# Patient Record
Sex: FEMALE | Race: WHITE | Employment: OTHER | URBAN - METROPOLITAN AREA
[De-identification: names, ages, dates, MRNs, and addresses within clinical notes are randomized per-mention and may not be internally consistent; named-entity substitution may affect disease eponyms.]

---

## 2018-02-03 ENCOUNTER — OFFICE VISIT (OUTPATIENT)
Dept: FAMILY MEDICINE CLINIC | Facility: CLINIC | Age: 78
End: 2018-02-03

## 2018-02-03 VITALS
HEART RATE: 70 BPM | HEIGHT: 60 IN | DIASTOLIC BLOOD PRESSURE: 70 MMHG | RESPIRATION RATE: 16 BRPM | TEMPERATURE: 98 F | SYSTOLIC BLOOD PRESSURE: 132 MMHG | BODY MASS INDEX: 43.78 KG/M2 | WEIGHT: 223 LBS | OXYGEN SATURATION: 94 %

## 2018-02-03 DIAGNOSIS — J06.9 VIRAL URI WITH COUGH: Primary | ICD-10-CM

## 2018-02-03 PROCEDURE — 99203 OFFICE O/P NEW LOW 30 MIN: CPT | Performed by: NURSE PRACTITIONER

## 2018-02-03 RX ORDER — OMEPRAZOLE 20 MG/1
CAPSULE, DELAYED RELEASE ORAL
Status: ON HOLD | COMMUNITY
Start: 2017-11-13 | End: 2021-10-07

## 2018-02-03 RX ORDER — ATENOLOL 50 MG/1
50 TABLET ORAL DAILY
COMMUNITY
Start: 2017-11-13 | End: 2022-01-28

## 2018-02-03 NOTE — PROGRESS NOTES
CHIEF COMPLAINT:   Patient presents with:  Cough: 1 day  Chest Congestion: 1 day      HPI:   Jolynn Simpson is a 68year old female who presents for upper respiratory symptoms for  1 days.  Patient reports dry cough, + influenza vaccine this season, EXTREMITIES: no cyanosis, clubbing or edema  LYMPH:  no lymphadenopathy.         ASSESSMENT AND PLAN:   Sofi Valladares is a 68year old female who presents with upper respiratory symptoms that are consistent with    ASSESSMENT:   Viral uri with coug · Your appetite may be poor, so a light diet is fine. Avoid dehydration by drinking 6 to 8 glasses of fluids per day (water, soft drinks, juices, tea, or soup). Extra fluids will help loosen secretions in the nose and lungs.   · Over-the-counter cold medici You have a viral upper respiratory illness (URI), which is another term for the common cold. This illness is contagious during the first few days. It is spread through the air by coughing and sneezing.  It may also be spread by direct contact (touching the · Cough with lots of colored sputum (mucus)  · Severe headache; face, neck, or ear pain  · Difficulty swallowing due to throat pain  · Fever of 100.4°F (38°C) or higher, or as directed by your healthcare provider  Call 911  Call 911 if any of these occur:

## 2018-02-03 NOTE — PATIENT INSTRUCTIONS
Viral Upper Respiratory Illness (Adult)  You have a viral upper respiratory illness (URI), which is another term for the common cold. This illness is contagious during the first few days. It is spread through the air by coughing and sneezing.  It may also Call your healthcare provider right away if any of these occur:  · Cough with lots of colored sputum (mucus)  · Severe headache; face, neck, or ear pain  · Difficulty swallowing due to throat pain  · Fever of 100.4°F (38°C) or higher, or as directed by you · You may use acetaminophen or ibuprofen to control pain and fever, unless another medicine was prescribed. If you have chronic liver or kidney disease, have ever had a stomach ulcer or gastrointestinal bleeding, or are taking blood-thinning medicines, pilo

## 2021-10-06 ENCOUNTER — APPOINTMENT (OUTPATIENT)
Dept: GENERAL RADIOLOGY | Age: 81
DRG: 605 | End: 2021-10-06
Attending: PHYSICIAN ASSISTANT
Payer: MEDICARE

## 2021-10-06 ENCOUNTER — HOSPITAL ENCOUNTER (INPATIENT)
Facility: HOSPITAL | Age: 81
LOS: 2 days | Discharge: HOME HEALTH | DRG: 605 | End: 2021-10-08
Attending: EMERGENCY MEDICINE | Admitting: HOSPITALIST
Payer: MEDICARE

## 2021-10-06 ENCOUNTER — OFFICE VISIT (OUTPATIENT)
Dept: FAMILY MEDICINE CLINIC | Facility: CLINIC | Age: 81
End: 2021-10-06
Payer: MEDICARE

## 2021-10-06 VITALS
TEMPERATURE: 98 F | OXYGEN SATURATION: 94 % | BODY MASS INDEX: 35.93 KG/M2 | HEART RATE: 66 BPM | HEIGHT: 60 IN | DIASTOLIC BLOOD PRESSURE: 39 MMHG | RESPIRATION RATE: 18 BRPM | WEIGHT: 183 LBS | SYSTOLIC BLOOD PRESSURE: 129 MMHG
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
## 2021-10-06 DIAGNOSIS — L03.114 CELLULITIS OF LEFT UPPER EXTREMITY: ICD-10-CM
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
## 2021-10-06 DIAGNOSIS — D72.829 LEUKOCYTOSIS, UNSPECIFIED TYPE: ICD-10-CM

## 2021-10-06 DIAGNOSIS — L03.119 CELLULITIS OF HAND: Primary | ICD-10-CM

## 2021-10-06 PROCEDURE — 99223 1ST HOSP IP/OBS HIGH 75: CPT | Performed by: HOSPITALIST

## 2021-10-06 PROCEDURE — 73130 X-RAY EXAM OF HAND: CPT | Performed by: PHYSICIAN ASSISTANT

## 2021-10-06 PROCEDURE — 3E0234Z INTRODUCTION OF SERUM, TOXOID AND VACCINE INTO MUSCLE, PERCUTANEOUS APPROACH: ICD-10-PCS | Performed by: HOSPITALIST

## 2021-10-06 RX ORDER — ACETAMINOPHEN 325 MG/1
650 TABLET ORAL EVERY 6 HOURS PRN
Status: DISCONTINUED | OUTPATIENT
Start: 2021-10-06 | End: 2021-10-08

## 2021-10-06 RX ORDER — ACETAMINOPHEN 325 MG/1
650 TABLET ORAL EVERY 4 HOURS PRN
Status: DISCONTINUED | OUTPATIENT
Start: 2021-10-06 | End: 2021-10-08

## 2021-10-06 RX ORDER — SODIUM CHLORIDE 9 MG/ML
INJECTION, SOLUTION INTRAVENOUS CONTINUOUS
Status: ACTIVE | OUTPATIENT
Start: 2021-10-06 | End: 2021-10-06

## 2021-10-06 RX ORDER — POLYETHYLENE GLYCOL 3350 17 G/17G
17 POWDER, FOR SOLUTION ORAL DAILY PRN
Status: DISCONTINUED | OUTPATIENT
Start: 2021-10-06 | End: 2021-10-08

## 2021-10-06 RX ORDER — DOCUSATE SODIUM 100 MG/1
100 CAPSULE, LIQUID FILLED ORAL 2 TIMES DAILY
Status: DISCONTINUED | OUTPATIENT
Start: 2021-10-06 | End: 2021-10-08

## 2021-10-06 RX ORDER — HYDROCODONE BITARTRATE AND ACETAMINOPHEN 5; 325 MG/1; MG/1
2 TABLET ORAL EVERY 4 HOURS PRN
Status: DISCONTINUED | OUTPATIENT
Start: 2021-10-06 | End: 2021-10-08

## 2021-10-06 RX ORDER — MORPHINE SULFATE 2 MG/ML
2 INJECTION, SOLUTION INTRAMUSCULAR; INTRAVENOUS EVERY 2 HOUR PRN
Status: DISCONTINUED | OUTPATIENT
Start: 2021-10-06 | End: 2021-10-08

## 2021-10-06 RX ORDER — MORPHINE SULFATE 4 MG/ML
4 INJECTION, SOLUTION INTRAMUSCULAR; INTRAVENOUS EVERY 2 HOUR PRN
Status: DISCONTINUED | OUTPATIENT
Start: 2021-10-06 | End: 2021-10-08

## 2021-10-06 RX ORDER — MORPHINE SULFATE 2 MG/ML
1 INJECTION, SOLUTION INTRAMUSCULAR; INTRAVENOUS EVERY 2 HOUR PRN
Status: DISCONTINUED | OUTPATIENT
Start: 2021-10-06 | End: 2021-10-08

## 2021-10-06 RX ORDER — METRONIDAZOLE 500 MG/100ML
500 INJECTION, SOLUTION INTRAVENOUS ONCE
Status: COMPLETED | OUTPATIENT
Start: 2021-10-06 | End: 2021-10-06

## 2021-10-06 RX ORDER — TETANUS AND DIPHTHERIA TOXOIDS ADSORBED 2; 2 [LF]/.5ML; [LF]/.5ML
0.5 INJECTION INTRAMUSCULAR ONCE
Status: COMPLETED | OUTPATIENT
Start: 2021-10-06 | End: 2021-10-06

## 2021-10-06 RX ORDER — METOCLOPRAMIDE HYDROCHLORIDE 5 MG/ML
5 INJECTION INTRAMUSCULAR; INTRAVENOUS EVERY 8 HOURS PRN
Status: DISCONTINUED | OUTPATIENT
Start: 2021-10-06 | End: 2021-10-08

## 2021-10-06 RX ORDER — LEVOFLOXACIN 500 MG/1
500 TABLET, FILM COATED ORAL ONCE
Status: COMPLETED | OUTPATIENT
Start: 2021-10-06 | End: 2021-10-06

## 2021-10-06 RX ORDER — HYDROCODONE BITARTRATE AND ACETAMINOPHEN 5; 325 MG/1; MG/1
1 TABLET ORAL EVERY 4 HOURS PRN
Status: DISCONTINUED | OUTPATIENT
Start: 2021-10-06 | End: 2021-10-08

## 2021-10-06 RX ORDER — ONDANSETRON 2 MG/ML
4 INJECTION INTRAMUSCULAR; INTRAVENOUS EVERY 6 HOURS PRN
Status: DISCONTINUED | OUTPATIENT
Start: 2021-10-06 | End: 2021-10-08

## 2021-10-06 RX ORDER — BISACODYL 10 MG
10 SUPPOSITORY, RECTAL RECTAL
Status: DISCONTINUED | OUTPATIENT
Start: 2021-10-06 | End: 2021-10-08

## 2021-10-06 NOTE — PLAN OF CARE
Patient in room, up in chair. Family at bedside. Vitals WDL, on RA, SCD's. Left hand has scratches x2. Hand is swollen, red, and warm +2 pitting edema. Patient states that moving the wrist and fingers is painful, 3/10. Denied pain medication at the moment.

## 2021-10-06 NOTE — PROGRESS NOTES
Spoke with Dr Lizzie Christopher regarding new ortho consult. He will see patient early tomorrow morning, do not need to order NPO at this time. Strict elevation of left hand on pillows.

## 2021-10-06 NOTE — ED INITIAL ASSESSMENT (HPI)
Pt was bit by cat on right wrist 4 days ago, c/o redness and swelling. Sent from [facility]. No known fevers.

## 2021-10-06 NOTE — PROGRESS NOTES
[facility] Pharmacy Note:  Renal Adjustment for ampicillin/sulbactam (UNASYN)    Jareth Sarah is a 80year old patient who has been prescribed ampicillin/sulbactam (UNASYN) 3000 mg every 8 hrs.   The estimated creatinine clearance is 26 mL/min (A) (based

## 2021-10-06 NOTE — ED PROVIDER NOTES
Patient Seen in: THE [facility] Emergency Department In [place]      History   Patient presents with:  Bite  Cellulitis    Stated Complaint: Cellulitis    Subjective:   HPI    51-year-old female. Medical history of morbid obesity and hypertension.   Arrives w (14) - Abnormal; Notable for the following components:       Result Value    Glucose 145 (*)     BUN 27 (*)     Creatinine 1.22 (*)     GFR, Non- 42 (*)     GFR, -American 48 (*)     AST 14 (*)     Albumin 3.3 (*)     A/G Ratio 0.9 ( pm    Follow-up:  No follow-up provider specified.         Medications Prescribed:  Current Discharge Medication List                          Hospital Problems             Present on Admission  Date Reviewed: 10/6/2021          ICD-10-CM Noted POA    * (Pr

## 2021-10-06 NOTE — H&P
[facility] HOSPITAL[facility]  History and Physical     Pierce Rucks Patient Status:  Inpatient    1940 MRN [id number]   [facility] 3SW-A Attending Arash Heaton [address] Day # 0 PCP No primary care provider on file.      Anju Altman No murmurs, rubs or gallops. Equal pulses. Chest and Back: No tenderness or deformity. Abdomen: Soft, nontender, nondistended. Positive bowel sounds. No rebound, guarding or organomegaly. Neurologic: No focal neurological deficits.  CNII-XII grossly in DO  97/2/8624          **Certification      PHYSICIAN Certification of Need for Inpatient Hospitalization - Initial Certification    Patient will require inpatient services that will reasonably be expected to span two midnight's based on the clinical docum

## 2021-10-06 NOTE — PROGRESS NOTES
CHIEF COMPLAINT:     Patient presents with:  Laceration/Abrasion      HPI:   Irma Maguire is a 80year old female who presents with complaints of cat bite. The patient reports her cat bit her on the left hand 4 days ago.   She now has pain, swell normal, no bulging, no retraction, no fluid, bony landmarks normal.  Left TM normal, no bulging, no retraction, no fluid, bony landmarks normal.    NOSE: nostrils patent, no discharge, nasal mucosa pink and not inflamed. No sinus tenderness.   THROAT: oral

## 2021-10-07 PROCEDURE — 99232 SBSQ HOSP IP/OBS MODERATE 35: CPT | Performed by: HOSPITALIST

## 2021-10-07 RX ORDER — ATENOLOL 50 MG/1
50 TABLET ORAL
Status: DISCONTINUED | OUTPATIENT
Start: 2021-10-07 | End: 2021-10-08

## 2021-10-07 RX ORDER — KETOROLAC TROMETHAMINE 15 MG/ML
15 INJECTION, SOLUTION INTRAMUSCULAR; INTRAVENOUS EVERY 6 HOURS PRN
Status: DISCONTINUED | OUTPATIENT
Start: 2021-10-07 | End: 2021-10-08

## 2021-10-07 RX ORDER — KETOROLAC TROMETHAMINE 30 MG/ML
30 INJECTION, SOLUTION INTRAMUSCULAR; INTRAVENOUS EVERY 6 HOURS PRN
Status: DISCONTINUED | OUTPATIENT
Start: 2021-10-07 | End: 2021-10-07 | Stop reason: DRUGHIGH

## 2021-10-07 NOTE — PROGRESS NOTES
[facility] Pharmacy Note:  Age Based Dose Adjustment    Rafiq Arriaga has been prescribed ketorolac (TORADOL) 15-30 mg IV every 6 hours prn. Patient is >71 years old therefore the dose has been adjusted to 15 mg IV every 6 hours prn.       Thank you,  Ra

## 2021-10-07 NOTE — CONSULTS
CHIEF COMPLAINT: Bite and Cellulitis      DIAGNOSIS: Cellulitis of hand  (primary encounter diagnosis)  Cat bite, initial encounter  Leukocytosis, unspecified type    HPI:   Grover Adkins is a 80year old female who presented to the ER for chief c ENDOCRINE: denies excessive thirst or urination; denies unexpected wt gain or wt loss. MUSCULOSKELETAL: As per HPI  ALLERGY/IMM.: denies food or seasonal allergies.      EXAM:   /87 (BP Location: Left arm)   Pulse 60   Temp 97.5 °F (36.4 °C) (Oral

## 2021-10-07 NOTE — PLAN OF CARE
Pt A&Ox4. [place]. On room air. SCDs to BLE. Cat bite to L wrist with redness/edema. Elevated on pillows. Ice packs for pain/swelling. IV unasyn q12. Pt tolerating cardiac diet. Last BM 10/6/21. Miralax given this AM. Pt voiding without difficulty.  Pain contro

## 2021-10-07 NOTE — PROGRESS NOTES
[facility]     Hospitalist Progress Note     Heena Dos Santos Patient Status:  Inpatient    1940 MRN [id number]   [facility] 3SW-A Attending Dannie Thompson MD   Hosp Day # 1 PCP No primary care provider on file.      Chief C CK in the last 168 hours. Inflammatory Markers  No results for input(s): CRP, RAIMUNDO, LDH, DDIMER in the last 168 hours. Imaging: Imaging data reviewed in Epic.     Medications:   • atenolol  50 mg Oral Daily Beta Blocker   • docusate sodium  100 mg Oral

## 2021-10-07 NOTE — PLAN OF CARE
Patient A&O X4 on RA, [place]. VSS, /IS/SCDs. Voiding freely, LBM 10/6. On IV Unasyn Q8h. Redness/warmth/edema noted to left hand- elevating on pillows per orders. Pain controlled with PO medication. Ice packs as needed. Min assist with walker.  Reminded to

## 2021-10-08 VITALS
BODY MASS INDEX: 35.73 KG/M2 | OXYGEN SATURATION: 93 % | WEIGHT: 182 LBS | TEMPERATURE: 98 F | HEIGHT: 60 IN | DIASTOLIC BLOOD PRESSURE: 63 MMHG | RESPIRATION RATE: 20 BRPM | SYSTOLIC BLOOD PRESSURE: 138 MMHG | HEART RATE: 68 BPM

## 2021-10-08 PROCEDURE — 99239 HOSP IP/OBS DSCHRG MGMT >30: CPT | Performed by: HOSPITALIST

## 2021-10-08 RX ORDER — AMOXICILLIN AND CLAVULANATE POTASSIUM 875; 125 MG/1; MG/1
1 TABLET, FILM COATED ORAL 2 TIMES DAILY
Qty: 20 TABLET | Refills: 0 | Status: SHIPPED | OUTPATIENT
Start: 2021-10-08 | End: 2021-10-18

## 2021-10-08 NOTE — DISCHARGE SUMMARY
[facility] HOSPITAL[facility]  DISCHARGE SUMMARY     Tabby Rolle Patient Status:  Inpatient    1940 MRN [id number]   [facility] 3SW-A Attending Adenike Lee MD   Hosp Day # 2 PCP No primary care provider on file.      Date of Admissi [facility] [address] [facility] , [phone], [address] [place] [address]    Hours: 24-hours Phone: [phone]   · amoxicillin clavulanate 875-125 MG Tabs         [place]    Follow-up appointment:   No follow-up pr

## 2021-10-08 NOTE — PROGRESS NOTES
[facility]     Hospitalist Progress Note     Gracecarlton Ingram Patient Status:  Inpatient    1940 MRN [id number]   [facility] 3SW-A Attending Anju Felder MD   Hosp Day # 2 PCP No primary care provider on file.      Chief C Markers  No results for input(s): CRP, RAIMUNDO, LDH, DDIMER in the last 168 hours. Imaging: Imaging data reviewed in Epic.     Medications:   • atenolol  50 mg Oral Daily Beta Blocker   • docusate sodium  100 mg Oral BID   • ampicillin-sulbactam  3 g Suze Spoon

## 2021-10-08 NOTE — PHYSICAL THERAPY NOTE
PHYSICAL THERAPY EVALUATION - INPATIENT     Room Number: 374/374-A  Evaluation Date: 10/8/2021  Type of Evaluation: Initial  Physician Order: PT Eval and Treat    Presenting Problem: L hand cellulits   Reason for Therapy: Mobility Dysfunction and Dis to rate  Location: L hand   Management Techniques:  Activity promotion; Repositioning    COGNITION  · Overall Cognitive Status:  WFL - within functional limits    RANGE OF MOTION AND STRENGTH ASSESSMENT  Upper extremity ROM and strength are within functiona Therapy Provided: Per RN suzanne to work with pt. Pt received up in chair and was agreeable to PT session. Pt educated on role of therapy, goals for the session, safety, fall prevention, equipment, activity recommendation, and discharge planning.  Pt and famil prior to level of function. DISCHARGE RECOMMENDATIONS  PT Discharge Recommendations: Home with home health PT; 24 hour care/supervision    PLAN  PT Treatment Plan: Bed mobility; Endurance; Energy conservation; Patient education;  Family education; Gait tra

## 2021-10-08 NOTE — HOME CARE LIAISON
Patient provided with list of [facility] providers from [address], patient choice is [facility]. Agency reserved in [address] and contact information placed on AVS.   Financial interest disclosure provided to patient. SW updated.

## 2021-10-08 NOTE — PLAN OF CARE
A&Ox4. VSS. On room air. SCDs on BLE. Last BM 10/6. Voiding freely. Pain controlled with PO medication. Cat bite to left wrist with redness and edema. Elevated on pillows. Ice packs in place. Tolerating IV antibiotic. Patient updated on plan of care.  Safet

## 2021-10-08 NOTE — CM/SW NOTE
Spoke with Joseph Richard from [facility] who spoke with pt's son, Emilee Vaughan. Pt/family agreeable with [facility] at DC. Met with pt/family and confirmed plan for [facility]. List of [facility] PCP MDs given. No further needs at this time.   / to remain available fo

## 2021-10-08 NOTE — PROGRESS NOTES
Flu shot given. Discharge instructions reviewed with patient and family members at bedside. All questions answered. IV removed. Antibiotic script filled by [facility]. Discharged home with residential home health services.

## 2021-10-08 NOTE — CM/SW NOTE
10/08/21 1300   CM/SW Referral Data   Referral Source Social Work (self-referral)   Reason for Referral Discharge planning   Informant Patient;  Son   Patient Info   Patient's Current Mental Status at Time of Assessment Alert; Oriented   Discharge Needs

## 2021-10-08 NOTE — PLAN OF CARE
Patient resting in room, vitals WDL, on RA. SCD's. Patient reports pain to hand 3/10, relieved by PO medication. Left hand red and swollen. Edema +1. IV antibiotics Q12. Plan is for home with oral antibiotics today.  Plan of care discussed with patient, al

## 2021-10-11 ENCOUNTER — PATIENT OUTREACH (OUTPATIENT)
Dept: CASE MANAGEMENT | Age: 81
End: 2021-10-11

## 2021-10-11 DIAGNOSIS — Z02.9 ENCOUNTERS FOR UNSPECIFIED ADMINISTRATIVE PURPOSE: ICD-10-CM

## 2021-10-11 NOTE — PROGRESS NOTES
NCM attempted to contact patient for hospital follow up. Unable to leave message as line rings then has busy signal then disconnects. NCM will try again later.

## 2021-10-12 NOTE — PROGRESS NOTES
NCM attempted to contact the patient for HFU. No answer after many rings and then goes to busy signal. NCM will try again another time.

## 2021-10-15 ENCOUNTER — OFFICE VISIT (OUTPATIENT)
Dept: FAMILY MEDICINE CLINIC | Facility: CLINIC | Age: 81
End: 2021-10-15
Payer: MEDICARE

## 2021-10-15 VITALS
RESPIRATION RATE: 16 BRPM | WEIGHT: 191.81 LBS | HEART RATE: 50 BPM | OXYGEN SATURATION: 98 % | DIASTOLIC BLOOD PRESSURE: 72 MMHG | BODY MASS INDEX: 37.66 KG/M2 | HEIGHT: 60 IN | SYSTOLIC BLOOD PRESSURE: 130 MMHG

## 2021-10-15 DIAGNOSIS — L03.119 CELLULITIS OF HAND: Primary | ICD-10-CM

## 2021-10-15 DIAGNOSIS — D72.829 LEUKOCYTOSIS, UNSPECIFIED TYPE: ICD-10-CM

## 2021-10-15 DIAGNOSIS — Z91.81 RISK FOR FALLS: ICD-10-CM

## 2021-10-15 DIAGNOSIS — Z78.0 MENOPAUSE PRESENT: ICD-10-CM

## 2021-10-15 DIAGNOSIS — R01.1 MURMUR, CARDIAC: ICD-10-CM

## 2021-10-15 DIAGNOSIS — I10 ESSENTIAL HYPERTENSION: Chronic | ICD-10-CM

## 2021-10-15 DIAGNOSIS — Z13.820 ENCOUNTER FOR SCREENING FOR OSTEOPOROSIS: ICD-10-CM

## 2021-10-15 PROCEDURE — 1111F DSCHRG MED/CURRENT MED MERGE: CPT | Performed by: EMERGENCY MEDICINE

## 2021-10-15 PROCEDURE — 99495 TRANSJ CARE MGMT MOD F2F 14D: CPT | Performed by: EMERGENCY MEDICINE

## 2021-10-15 NOTE — PATIENT INSTRUCTIONS
Thank you for choosing [facility]  To Do:  FOR CHASIDY Cronin-er 59        1. Continue with physical therapy home health  2. Arrange for ultrasound of heart  3. Have blood tests done after fasting  4.  Follow up in 2 weeks for Medicare annaul we heartbeat  · Swollen ankles, feet, abdomen  · Feeling dizzy or faint  · Poor feeding and failing to grow normally (babies only)  How is a heart murmur treated?    An innocent heart murmur doesn't usually need treatment unless there is a clear cause, such as blood through your body. That job starts with pumping blood through the heart itself. Inside your heart, blood passes through a series of one-way salcedo (valves). If a valve doesn't work correctly, not enough blood moves forward.  A problem heart valve may n blood through the valve. Stenosis     Problems closing (regurgitation)  When a valve doesn’t close tightly enough and blood leaks backward through the valve, the problem is called regurgitation or insufficiency.  The valve itself may be described as rachel the heart. These sound waves become images on a video screen. Your doctor can then see a moving picture of your heart. This test shows how the valves work. It can confirm whether a valve is narrowed or leaking.  It can also show the size of the heart's tj tips  · Read the fact sheet that comes with your medicine. Ask for a sheet if you don’t get one. · Take your beta-blocker exactly as directed. Follow the directions on the label. · Take your medicine at the same time or times every day.   · If you take a legs or feet  · Yellowing skin or eyes  · Numbness or tingling  · Skin rash or itching  Laci last reviewed this educational content on 7/1/2019  © 3867-7170 [employer]. All rights reserved.  This information is not intended as a substitut or pharmacist to teach you how to use it. Many people can check their own blood pressure at home without difficulty. Some need help from a family member or friend.    Your home blood pressure reading is more likely to be accurate if you do the following:  · with you to your healthcare appointments and check the accuracy of the monitor against the reading the providers are getting. © 8100-3799 [employer] All rights reserved.  This information is not intended as a substitute for professional me

## 2021-10-15 NOTE — PROGRESS NOTES
HPI:    Grover Adkins is a 80year old female here today for hospital follow up.    She was discharged from Inpatient hospital, [facility] to Home   Admission Date: 10/6/21   Discharge Date: 10/8/21  Hospital Discharge Diagnoses (since 9/15/20 MG Oral Tab,   Flurbiprofen 100 MG Oral Tab,     No current facility-administered medications on file prior to visit. HISTORY: reconciled and reviewed with patient  She  has a past medical history of Essential hypertension and Osteoarthritis.     She HSM or tenderness  MUSCULOSKELETAL: back is not tender, FROM of the extremities  EXTREMITIES: no cyanosis, clubbing or edema  NEURO: Oriented times three, cranial nerves are intact, motor and sensory are grossly intact    ASSESSMENT/ PLAN:   Diagnoses and

## 2021-10-18 NOTE — PROGRESS NOTES
Multiple attempts made to reach the patient for hospital follow up, with no response or return call. Patient completed HFU on 10-15-21 with PCP. NCM closing encounter.

## 2021-10-20 ENCOUNTER — HOSPITAL ENCOUNTER (OUTPATIENT)
Dept: BONE DENSITY | Age: 81
Discharge: HOME | End: 2021-10-20
Attending: EMERGENCY MEDICINE
Payer: MEDICARE

## 2021-10-20 ENCOUNTER — HOSPITAL ENCOUNTER (OUTPATIENT)
Dept: CV DIAGNOSTICS | Facility: HOSPITAL | Age: 81
Discharge: HOME | End: 2021-10-20
Attending: EMERGENCY MEDICINE
Payer: MEDICARE

## 2021-10-20 ENCOUNTER — HOSPITAL ENCOUNTER (OUTPATIENT)
Dept: LAB | Facility: HOSPITAL | Age: 81
Discharge: HOME | End: 2021-10-20
Attending: EMERGENCY MEDICINE
Payer: MEDICARE

## 2021-10-20 DIAGNOSIS — Z13.820 ENCOUNTER FOR SCREENING FOR OSTEOPOROSIS: ICD-10-CM

## 2021-10-20 DIAGNOSIS — Z78.0 MENOPAUSE PRESENT: ICD-10-CM

## 2021-10-20 DIAGNOSIS — R01.1 MURMUR, CARDIAC: ICD-10-CM

## 2021-10-20 DIAGNOSIS — D72.829 LEUKOCYTOSIS, UNSPECIFIED TYPE: ICD-10-CM

## 2021-10-20 DIAGNOSIS — I10 ESSENTIAL HYPERTENSION: Chronic | ICD-10-CM

## 2021-10-20 PROCEDURE — 36415 COLL VENOUS BLD VENIPUNCTURE: CPT

## 2021-10-20 PROCEDURE — 80053 COMPREHEN METABOLIC PANEL: CPT

## 2021-10-20 PROCEDURE — 77080 DXA BONE DENSITY AXIAL: CPT | Performed by: EMERGENCY MEDICINE

## 2021-10-20 PROCEDURE — 80061 LIPID PANEL: CPT

## 2021-10-20 PROCEDURE — 84443 ASSAY THYROID STIM HORMONE: CPT

## 2021-10-20 PROCEDURE — 93306 TTE W/DOPPLER COMPLETE: CPT | Performed by: EMERGENCY MEDICINE

## 2021-10-20 PROCEDURE — 85025 COMPLETE CBC W/AUTO DIFF WBC: CPT

## 2021-10-25 ENCOUNTER — TELEPHONE (OUTPATIENT)
Dept: FAMILY MEDICINE CLINIC | Facility: CLINIC | Age: 81
End: 2021-10-25

## 2021-10-25 DIAGNOSIS — I35.0 AORTIC VALVE STENOSIS, ETIOLOGY OF CARDIAC VALVE DISEASE UNSPECIFIED: Primary | ICD-10-CM

## 2021-10-25 NOTE — TELEPHONE ENCOUNTER
----- Message from Kevin Nicole MD sent at 10/24/2021  1:25 PM CDT -----  Stable bone density, no evidence for osteoporosis

## 2021-10-25 NOTE — TELEPHONE ENCOUNTER
----- Message from Reynold Lozano MD sent at 10/25/2021  8:59 AM CDT -----  + aortic stenosis, pls refer patient to Dr. Gregoria Mcdonough give ER precautions. To Er if with SOB, CP, or syncope.

## 2021-10-29 ENCOUNTER — OFFICE VISIT (OUTPATIENT)
Dept: FAMILY MEDICINE CLINIC | Facility: CLINIC | Age: 81
End: 2021-10-29
Payer: MEDICARE

## 2021-10-29 VITALS
WEIGHT: 192 LBS | HEART RATE: 60 BPM | DIASTOLIC BLOOD PRESSURE: 70 MMHG | HEIGHT: 59 IN | BODY MASS INDEX: 38.71 KG/M2 | RESPIRATION RATE: 16 BRPM | SYSTOLIC BLOOD PRESSURE: 122 MMHG | OXYGEN SATURATION: 98 %

## 2021-10-29 DIAGNOSIS — Z23 NEED FOR PNEUMOCOCCAL VACCINE: ICD-10-CM

## 2021-10-29 DIAGNOSIS — Z00.00 ENCOUNTER FOR ANNUAL HEALTH EXAMINATION: Primary | ICD-10-CM

## 2021-10-29 DIAGNOSIS — I35.0 AORTIC VALVE STENOSIS, ETIOLOGY OF CARDIAC VALVE DISEASE UNSPECIFIED: ICD-10-CM

## 2021-10-29 DIAGNOSIS — M16.12 PRIMARY OSTEOARTHRITIS OF LEFT HIP: ICD-10-CM

## 2021-10-29 DIAGNOSIS — I10 ESSENTIAL HYPERTENSION: ICD-10-CM

## 2021-10-29 DIAGNOSIS — M17.12 PRIMARY OSTEOARTHRITIS OF LEFT KNEE: ICD-10-CM

## 2021-10-29 PROCEDURE — G0009 ADMIN PNEUMOCOCCAL VACCINE: HCPCS | Performed by: EMERGENCY MEDICINE

## 2021-10-29 PROCEDURE — 90670 PCV13 VACCINE IM: CPT | Performed by: EMERGENCY MEDICINE

## 2021-10-29 PROCEDURE — G0444 DEPRESSION SCREEN ANNUAL: HCPCS | Performed by: EMERGENCY MEDICINE

## 2021-10-29 PROCEDURE — G0439 PPPS, SUBSEQ VISIT: HCPCS | Performed by: EMERGENCY MEDICINE

## 2021-10-29 RX ORDER — MELOXICAM 15 MG/1
15 TABLET ORAL DAILY
Qty: 30 TABLET | Refills: 3 | Status: SHIPPED | OUTPATIENT
Start: 2021-10-29

## 2021-10-29 NOTE — PROGRESS NOTES
HPI:   Hari Sanchez is a 80year old female who presents for a Medicare Subsequent Annual Wellness visit (Pt already had Initial Annual Wellness).       Her last annual assessment has been over 1 year: Annual Physical due on 10/29/2022 screening of functional status. Vision Problems? : Yes   She has Walking problems based on screening of functional status.            Depression Screening (PHQ-2/PHQ-9): Over the LAST 2 WEEKS   Little interest or pleasure in doing things: Not at all  Feel Her family history is not on file. SOCIAL HISTORY:   She  reports that she has never smoked. She has never used smokeless tobacco. She reports current alcohol use. No history on file for drug use.      REVIEW OF SYSTEMS:   GENERAL: feels well otherwise unlabored   Heart:  Regular rate and rhythm, S1 and S2 normal, + systolic harsh murmur, no rub, or gallop   Abdomen:   Soft, non-tender, bowel sounds active all four quadrants,  no masses, no organomegaly   Pelvic: Deferred   Extremities: Extremities kate months  7. Arrange for 3rd COVID shot    The patient indicates understanding of these issues and agrees to the plan. Reinforced healthy diet, lifestyle, and exercise. No follow-ups on file.      Kate Sorensen MD, 10/29/2021     General Health     In family history -     Colorectal Cancer Screening  Covered for ages 52-80; only need ONE of the following:    Colonoscopy   Covered every 10 years    Covered every 2 years if patient is at high risk or previous colonoscopy was abnormal -    No recommendatio

## 2021-10-29 NOTE — PATIENT INSTRUCTIONS
Thank you for choosing [facility]  To Do:  [facility]        1. Follow up with cardiology, Dr. Naomy Schmidt  2. Continue with atenolol  3. Ok to stop Fluboprofen and start Meloxicam once a day as needed  4.  Continue with home health ph abnormal -    No recommendations at this time    Flexible Sigmoidoscopy   Covered every 4 years -    Fecal Occult Blood Test Covered annually -   Bone Density Screening    Bone density screening    Covered every 2 years after age 72 if diagnosed with risk http://www. [place]ph.state. [place]/public/books/advin.htm  A link to the [employer]. This site has a lot of good information including definitions of the different types of Advance Directives.  It also has the State forms available on it's webs

## 2021-10-31 PROBLEM — M17.12 PRIMARY OSTEOARTHRITIS OF LEFT KNEE: Status: ACTIVE | Noted: 2021-10-31

## 2021-10-31 PROBLEM — I35.0 AORTIC VALVE STENOSIS: Status: ACTIVE | Noted: 2021-10-31

## 2021-10-31 PROBLEM — M16.12 PRIMARY OSTEOARTHRITIS OF LEFT HIP: Status: ACTIVE | Noted: 2021-10-31

## 2021-11-16 ENCOUNTER — TELEPHONE (OUTPATIENT)
Dept: FAMILY MEDICINE CLINIC | Facility: CLINIC | Age: 81
End: 2021-11-16

## 2021-11-16 NOTE — TELEPHONE ENCOUNTER
Marga Black from [facility] called ([phone]), pt's HR is 54 today, she is asymptomatic. Pt's HR runs in the 50s, & per their parameters they need to call if HR<60.   She is asking if parameter can be changed to call if HR< 50.

## 2021-11-17 NOTE — TELEPHONE ENCOUNTER
Spoke with Angie Echevarria, physical therapist with [facility]. Provided verbal order to change parameter to if heart rate below 50. Angie Echevarria verbalized understanding. Nothing further needed at this time.

## 2021-11-19 ENCOUNTER — TELEPHONE (OUTPATIENT)
Dept: FAMILY MEDICINE CLINIC | Facility: CLINIC | Age: 81
End: 2021-11-19

## 2021-11-19 NOTE — TELEPHONE ENCOUNTER
MARÍAI: Received a call from [facility], talked with Physical Therapist, Sergio Anthony who was requesting to extend PT for pt to 1/week for 2 weeks and then possibly re-cert pt. Verbal order given for okay to extend PT. PT, Sergio Anthony verbalized understanding.    LOV:  10/29/21

## 2021-11-24 ENCOUNTER — TELEPHONE (OUTPATIENT)
Dept: FAMILY MEDICINE CLINIC | Facility: CLINIC | Age: 81
End: 2021-11-24

## 2021-11-29 ENCOUNTER — TELEPHONE (OUTPATIENT)
Dept: FAMILY MEDICINE CLINIC | Facility: CLINIC | Age: 81
End: 2021-11-29

## 2021-11-29 ENCOUNTER — MED REC SCAN ONLY (OUTPATIENT)
Dept: FAMILY MEDICINE CLINIC | Facility: CLINIC | Age: 81
End: 2021-11-29

## 2021-11-29 DIAGNOSIS — M16.12 PRIMARY OSTEOARTHRITIS OF LEFT HIP: ICD-10-CM

## 2021-11-29 DIAGNOSIS — M17.12 PRIMARY OSTEOARTHRITIS OF LEFT KNEE: ICD-10-CM

## 2021-11-29 NOTE — TELEPHONE ENCOUNTER
[facility] wanted to inform dr Evi Mcdaniel he is going to see pt this week which is an added visit.

## 2021-11-30 RX ORDER — MELOXICAM 15 MG/1
15 TABLET ORAL DAILY
Qty: 30 TABLET | Refills: 3 | OUTPATIENT
Start: 2021-11-30

## 2021-12-01 ENCOUNTER — TELEPHONE (OUTPATIENT)
Dept: FAMILY MEDICINE CLINIC | Facility: CLINIC | Age: 81
End: 2021-12-01

## 2021-12-01 NOTE — TELEPHONE ENCOUNTER
[facility] calling patient is being re certified  Nursing 1 visit per week for 8 weeks  PT will evaluate

## 2021-12-01 NOTE — TELEPHONE ENCOUNTER
BENOIT: RNAp from [place] [address] is calling so pt can be re-certified for Nursing 1 visit per week for 8 weeks and for PT. Verbal order given for okay to re-cert pt.    LOV: 10/29/21

## 2021-12-02 ENCOUNTER — IMMUNIZATION (OUTPATIENT)
Dept: LAB | Facility: HOSPITAL | Age: 81
End: 2021-12-02
Attending: EMERGENCY MEDICINE
Payer: MEDICARE

## 2021-12-02 DIAGNOSIS — Z23 NEED FOR VACCINATION: Primary | ICD-10-CM

## 2021-12-02 PROCEDURE — 0064A SARSCOV2 VAC 50MCG/0.25ML IM: CPT

## 2021-12-08 ENCOUNTER — TELEPHONE (OUTPATIENT)
Dept: ORTHOPEDICS CLINIC | Facility: CLINIC | Age: 81
End: 2021-12-08

## 2021-12-08 DIAGNOSIS — M25.562 LEFT KNEE PAIN, UNSPECIFIED CHRONICITY: ICD-10-CM

## 2021-12-08 DIAGNOSIS — M25.552 LEFT HIP PAIN: Primary | ICD-10-CM

## 2021-12-08 NOTE — TELEPHONE ENCOUNTER
X-rays ordered for up coming appointment , patient informed to come 15-20 minutes earlier to complete imaging.  Patient understood

## 2021-12-10 ENCOUNTER — OFFICE VISIT (OUTPATIENT)
Dept: ORTHOPEDICS CLINIC | Facility: CLINIC | Age: 81
End: 2021-12-10
Payer: MEDICARE

## 2021-12-10 ENCOUNTER — HOSPITAL ENCOUNTER (OUTPATIENT)
Dept: GENERAL RADIOLOGY | Age: 81
Discharge: HOME | End: 2021-12-10
Attending: ORTHOPAEDIC SURGERY
Payer: MEDICARE

## 2021-12-10 VITALS — WEIGHT: 193 LBS | BODY MASS INDEX: 38.91 KG/M2 | HEIGHT: 59 IN | HEART RATE: 55 BPM | OXYGEN SATURATION: 97 %

## 2021-12-10 DIAGNOSIS — M16.12 PRIMARY OSTEOARTHRITIS OF LEFT HIP: Primary | ICD-10-CM

## 2021-12-10 DIAGNOSIS — M25.552 LEFT HIP PAIN: ICD-10-CM

## 2021-12-10 DIAGNOSIS — M25.562 LEFT KNEE PAIN, UNSPECIFIED CHRONICITY: ICD-10-CM

## 2021-12-10 DIAGNOSIS — M17.12 PRIMARY OSTEOARTHRITIS OF LEFT KNEE: ICD-10-CM

## 2021-12-10 PROCEDURE — 73502 X-RAY EXAM HIP UNI 2-3 VIEWS: CPT | Performed by: ORTHOPAEDIC SURGERY

## 2021-12-10 PROCEDURE — 99203 OFFICE O/P NEW LOW 30 MIN: CPT | Performed by: ORTHOPAEDIC SURGERY

## 2021-12-10 PROCEDURE — 73564 X-RAY EXAM KNEE 4 OR MORE: CPT | Performed by: ORTHOPAEDIC SURGERY

## 2021-12-10 NOTE — H&P
[facility] New Patient Note    CC: Patient presents with:  Hip Pain: left hip pain  Knee Pain: left knee pain       HPI: This 80year old female presents today with complaints of left hip and knee pain.   Patient moved from [place] recently, lives with oanh obtained and negative except as mentioned above      Physical Exam:    Pulse 55   Ht 4' 11\" (1.499 m)   Wt 193 lb (87.5 kg)   SpO2 97%   BMI 38.98 kg/m²   Constitutional: Awake, alert, no distress. Psychological: Appropriate affect.   Respiratory: Mortimer Serge we would not want to do any sort of joint replacement surgery prior to this. Even afterwards, with her medical issues and deconditioning/immobility, she may be high risk for surgical intervention.   She expressed understanding and states that she does not

## 2021-12-14 ENCOUNTER — TELEPHONE (OUTPATIENT)
Dept: FAMILY MEDICINE CLINIC | Facility: CLINIC | Age: 81
End: 2021-12-14

## 2021-12-14 NOTE — TELEPHONE ENCOUNTER
Pt's daughter in law [place] [address] called. She wants to know what pt can take otc for a runny nose based on her two meds that she takes? Pls call Noah

## 2021-12-14 NOTE — TELEPHONE ENCOUNTER
Pt has been having a runny nose. Dtr is asking what pt can take OTC to help this, she is on Meloxicam & Atenolol.   Is Zyrtec OK for pt?

## 2021-12-16 NOTE — TELEPHONE ENCOUNTER
Discussed w/ DIL. She is concerned w/ possible interactions of OTC meds and prescribed meds. I recommended she discuss further w/ pharmacist when she picks up meds. Verbalized understanding and agreeable.

## 2021-12-30 ENCOUNTER — MED REC SCAN ONLY (OUTPATIENT)
Dept: FAMILY MEDICINE CLINIC | Facility: CLINIC | Age: 81
End: 2021-12-30

## 2022-01-06 ENCOUNTER — HOSPITAL ENCOUNTER (OUTPATIENT)
Dept: LAB | Facility: HOSPITAL | Age: 82
Discharge: HOME | End: 2022-01-06
Attending: INTERNAL MEDICINE
Payer: MEDICARE

## 2022-01-06 ENCOUNTER — HOSPITAL ENCOUNTER (OUTPATIENT)
Dept: CV DIAGNOSTICS | Facility: HOSPITAL | Age: 82
Discharge: HOME | End: 2022-01-06
Attending: INTERNAL MEDICINE
Payer: MEDICARE

## 2022-01-06 ENCOUNTER — HOSPITAL ENCOUNTER (OUTPATIENT)
Dept: ULTRASOUND IMAGING | Facility: HOSPITAL | Age: 82
Discharge: HOME | End: 2022-01-06
Attending: INTERNAL MEDICINE
Payer: MEDICARE

## 2022-01-06 ENCOUNTER — HOSPITAL ENCOUNTER (OUTPATIENT)
Dept: CT IMAGING | Facility: HOSPITAL | Age: 82
Discharge: HOME | End: 2022-01-06
Attending: INTERNAL MEDICINE
Payer: MEDICARE

## 2022-01-06 ENCOUNTER — HOSPITAL ENCOUNTER (OUTPATIENT)
Dept: CARDIOLOGY CLINIC | Facility: HOSPITAL | Age: 82
Discharge: HOME | End: 2022-01-06
Attending: INTERNAL MEDICINE
Payer: MEDICARE

## 2022-01-06 VITALS — OXYGEN SATURATION: 96 % | DIASTOLIC BLOOD PRESSURE: 58 MMHG | HEART RATE: 46 BPM | SYSTOLIC BLOOD PRESSURE: 144 MMHG

## 2022-01-06 VITALS — HEART RATE: 59 BPM | DIASTOLIC BLOOD PRESSURE: 69 MMHG | SYSTOLIC BLOOD PRESSURE: 160 MMHG

## 2022-01-06 DIAGNOSIS — I35.0 AORTIC STENOSIS: ICD-10-CM

## 2022-01-06 DIAGNOSIS — I65.29 CAROTID STENOSIS, ASYMPTOMATIC: ICD-10-CM

## 2022-01-06 LAB
ANION GAP SERPL CALC-SCNC: 6 MMOL/L (ref 0–18)
ATRIAL RATE: 46 BPM
BUN BLD-MCNC: 32 MG/DL (ref 7–18)
CALCIUM BLD-MCNC: 9.4 MG/DL (ref 8.5–10.1)
CHLORIDE SERPL-SCNC: 108 MMOL/L (ref 98–112)
CO2 SERPL-SCNC: 25 MMOL/L (ref 21–32)
CREAT BLD-MCNC: 1.25 MG/DL
GLUCOSE BLD-MCNC: 94 MG/DL (ref 70–99)
OSMOLALITY SERPL CALC.SUM OF ELEC: 295 MOSM/KG (ref 275–295)
P AXIS: 44 DEGREES
P-R INTERVAL: 146 MS
POTASSIUM SERPL-SCNC: 4.5 MMOL/L (ref 3.5–5.1)
Q-T INTERVAL: 484 MS
QRS DURATION: 94 MS
QTC CALCULATION (BEZET): 423 MS
R AXIS: 30 DEGREES
SODIUM SERPL-SCNC: 139 MMOL/L (ref 136–145)
T AXIS: 15 DEGREES
VENTRICULAR RATE: 46 BPM

## 2022-01-06 PROCEDURE — 74174 CTA ABD&PLVS W/CONTRAST: CPT | Performed by: INTERNAL MEDICINE

## 2022-01-06 PROCEDURE — 80048 BASIC METABOLIC PNL TOTAL CA: CPT | Performed by: INTERNAL MEDICINE

## 2022-01-06 PROCEDURE — 93005 ELECTROCARDIOGRAM TRACING: CPT

## 2022-01-06 PROCEDURE — 93880 EXTRACRANIAL BILAT STUDY: CPT | Performed by: INTERNAL MEDICINE

## 2022-01-06 PROCEDURE — 71275 CT ANGIOGRAPHY CHEST: CPT | Performed by: INTERNAL MEDICINE

## 2022-01-06 PROCEDURE — 99212 OFFICE O/P EST SF 10 MIN: CPT

## 2022-01-06 PROCEDURE — 36415 COLL VENOUS BLD VENIPUNCTURE: CPT | Performed by: INTERNAL MEDICINE

## 2022-01-06 PROCEDURE — 93010 ELECTROCARDIOGRAM REPORT: CPT | Performed by: INTERNAL MEDICINE

## 2022-01-06 NOTE — IMAGING NOTE
Received patient in Radiology Holding area. Patient alert and coherent. Denies CP distress. Explained procedure to patient.      0.9 NS flush =  60 ml  Contrast = 90 ml  Average HR = 60  Contrast injected followed by saline flush at 1130    Patient tolerate

## 2022-01-06 NOTE — PROGRESS NOTES
TAVR education provided to patient and daughter in law. TAVR video viewed. Education folder given, all questions answered.

## 2022-01-06 NOTE — CONSULTS
[facility]    Report of Consultation    Stacy Rae [facility] Patient Status:  Outpatient    1940 MRN [id number]   Location [facility] Attending Kely Lewis MD   Hosp Day # 0 PCP Lilly Randolph MD S1,S2 and a loud systolic murmur. No rub or extra heart sounds appreciated. Abdomen is soft nontender with no organomegaly mass or appreciable aneurysm. Extremities are without cyanosis clubbing or edema.   Pulses are equal and intact in all extremities

## 2022-01-13 ENCOUNTER — TELEPHONE (OUTPATIENT)
Dept: CARDIOLOGY CLINIC | Facility: HOSPITAL | Age: 82
End: 2022-01-13

## 2022-01-13 NOTE — TELEPHONE ENCOUNTER
Notified that patient is a candidate for TAVR, per Dr. Delon Lott will need C prior to procedure. [facility] notified, they will call to set up. Verbalized understanding.

## 2022-01-23 ENCOUNTER — LAB ENCOUNTER (OUTPATIENT)
Dept: LAB | Facility: HOSPITAL | Age: 82
End: 2022-01-23
Attending: INTERNAL MEDICINE
Payer: MEDICARE

## 2022-01-23 ENCOUNTER — HOSPITAL ENCOUNTER (OUTPATIENT)
Dept: GENERAL RADIOLOGY | Facility: HOSPITAL | Age: 82
Discharge: HOME | End: 2022-01-23
Attending: INTERNAL MEDICINE
Payer: MEDICARE

## 2022-01-23 DIAGNOSIS — I35.0 AORTIC STENOSIS: ICD-10-CM

## 2022-01-23 PROCEDURE — 93005 ELECTROCARDIOGRAM TRACING: CPT

## 2022-01-23 PROCEDURE — 71046 X-RAY EXAM CHEST 2 VIEWS: CPT | Performed by: INTERNAL MEDICINE

## 2022-01-23 PROCEDURE — 93010 ELECTROCARDIOGRAM REPORT: CPT | Performed by: INTERNAL MEDICINE

## 2022-01-24 LAB
ATRIAL RATE: 60 BPM
P AXIS: 64 DEGREES
P-R INTERVAL: 164 MS
Q-T INTERVAL: 436 MS
QRS DURATION: 76 MS
QTC CALCULATION (BEZET): 436 MS
R AXIS: 52 DEGREES
SARS-COV-2 RNA RESP QL NAA+PROBE: NOT DETECTED
T AXIS: 47 DEGREES
VENTRICULAR RATE: 60 BPM

## 2022-01-26 ENCOUNTER — HOSPITAL ENCOUNTER (OUTPATIENT)
Dept: INTERVENTIONAL RADIOLOGY/VASCULAR | Facility: HOSPITAL | Age: 82
Discharge: HOME | End: 2022-01-26
Attending: INTERNAL MEDICINE | Admitting: INTERNAL MEDICINE
Payer: MEDICARE

## 2022-01-26 VITALS
RESPIRATION RATE: 16 BRPM | OXYGEN SATURATION: 95 % | SYSTOLIC BLOOD PRESSURE: 162 MMHG | DIASTOLIC BLOOD PRESSURE: 45 MMHG | HEART RATE: 50 BPM | TEMPERATURE: 98 F

## 2022-01-26 DIAGNOSIS — I35.0 AORTIC STENOSIS: Primary | ICD-10-CM

## 2022-01-26 DIAGNOSIS — M31.4: ICD-10-CM

## 2022-01-26 LAB
ANION GAP SERPL CALC-SCNC: 3 MMOL/L (ref 0–18)
BASOPHILS # BLD AUTO: 0.08 X10(3) UL (ref 0–0.2)
BASOPHILS NFR BLD AUTO: 1.1 %
BUN BLD-MCNC: 32 MG/DL (ref 7–18)
CALCIUM BLD-MCNC: 8.8 MG/DL (ref 8.5–10.1)
CHLORIDE SERPL-SCNC: 112 MMOL/L (ref 98–112)
CO2 SERPL-SCNC: 25 MMOL/L (ref 21–32)
CREAT BLD-MCNC: 1.11 MG/DL
EOSINOPHIL # BLD AUTO: 0.77 X10(3) UL (ref 0–0.7)
EOSINOPHIL NFR BLD AUTO: 10.7 %
ERYTHROCYTE [DISTWIDTH] IN BLOOD BY AUTOMATED COUNT: 14 %
GLUCOSE BLD-MCNC: 97 MG/DL (ref 70–99)
HCT VFR BLD AUTO: 39.2 %
HGB BLD-MCNC: 12.1 G/DL
IMM GRANULOCYTES # BLD AUTO: 0.01 X10(3) UL (ref 0–1)
IMM GRANULOCYTES NFR BLD: 0.1 %
LYMPHOCYTES # BLD AUTO: 1.79 X10(3) UL (ref 1–4)
LYMPHOCYTES NFR BLD AUTO: 24.8 %
MCH RBC QN AUTO: 29.1 PG (ref 26–34)
MCHC RBC AUTO-ENTMCNC: 30.9 G/DL (ref 31–37)
MCV RBC AUTO: 94.2 FL
MONOCYTES # BLD AUTO: 0.69 X10(3) UL (ref 0.1–1)
MONOCYTES NFR BLD AUTO: 9.5 %
NEUTROPHILS # BLD AUTO: 3.89 X10 (3) UL (ref 1.5–7.7)
NEUTROPHILS # BLD AUTO: 3.89 X10(3) UL (ref 1.5–7.7)
NEUTROPHILS NFR BLD AUTO: 53.8 %
OSMOLALITY SERPL CALC.SUM OF ELEC: 297 MOSM/KG (ref 275–295)
PLATELET # BLD AUTO: 191 10(3)UL (ref 150–450)
POTASSIUM SERPL-SCNC: 4.8 MMOL/L (ref 3.5–5.1)
RBC # BLD AUTO: 4.16 X10(6)UL
SODIUM SERPL-SCNC: 140 MMOL/L (ref 136–145)
WBC # BLD AUTO: 7.2 X10(3) UL (ref 4–11)

## 2022-01-26 PROCEDURE — 85025 COMPLETE CBC W/AUTO DIFF WBC: CPT | Performed by: INTERNAL MEDICINE

## 2022-01-26 PROCEDURE — 93454 CORONARY ARTERY ANGIO S&I: CPT

## 2022-01-26 PROCEDURE — 36415 COLL VENOUS BLD VENIPUNCTURE: CPT | Performed by: INTERNAL MEDICINE

## 2022-01-26 PROCEDURE — B2111ZZ FLUOROSCOPY OF MULTIPLE CORONARY ARTERIES USING LOW OSMOLAR CONTRAST: ICD-10-PCS | Performed by: INTERNAL MEDICINE

## 2022-01-26 PROCEDURE — 36415 COLL VENOUS BLD VENIPUNCTURE: CPT

## 2022-01-26 PROCEDURE — 80048 BASIC METABOLIC PNL TOTAL CA: CPT | Performed by: INTERNAL MEDICINE

## 2022-01-26 RX ORDER — HEPARIN SODIUM 5000 [USP'U]/ML
INJECTION, SOLUTION INTRAVENOUS; SUBCUTANEOUS
Status: COMPLETED
Start: 2022-01-26 | End: 2022-01-26

## 2022-01-26 RX ORDER — ASPIRIN 81 MG/1
TABLET, CHEWABLE ORAL
Status: COMPLETED
Start: 2022-01-26 | End: 2022-01-26

## 2022-01-26 RX ORDER — LIDOCAINE HYDROCHLORIDE 10 MG/ML
INJECTION, SOLUTION EPIDURAL; INFILTRATION; INTRACAUDAL; PERINEURAL
Status: COMPLETED
Start: 2022-01-26 | End: 2022-01-26

## 2022-01-26 RX ORDER — SODIUM CHLORIDE 9 MG/ML
INJECTION, SOLUTION INTRAVENOUS
Status: DISCONTINUED | OUTPATIENT
Start: 2022-01-27 | End: 2022-01-26 | Stop reason: HOSPADM

## 2022-01-26 RX ORDER — MIDAZOLAM HYDROCHLORIDE 1 MG/ML
INJECTION INTRAMUSCULAR; INTRAVENOUS
Status: COMPLETED
Start: 2022-01-26 | End: 2022-01-26

## 2022-01-26 RX ORDER — ASPIRIN 81 MG/1
81 TABLET, CHEWABLE ORAL ONCE
Status: DISCONTINUED | OUTPATIENT
Start: 2022-01-26 | End: 2022-01-26 | Stop reason: HOSPADM

## 2022-01-26 NOTE — PROGRESS NOTES
Patient received from cath lab . Rightgroin site soft, no hematoma, dressing C/D/I. Pulses intact. Hemodynamics stable. Post-op orders received and implemented. Patient and family educated on flat bedrest, verbalize understanding.   Will continue to [facility]

## 2022-01-27 ENCOUNTER — TELEPHONE (OUTPATIENT)
Dept: CARDIOLOGY CLINIC | Facility: HOSPITAL | Age: 82
End: 2022-01-27

## 2022-01-27 NOTE — TELEPHONE ENCOUNTER
Preop TAVR Instructions:    I spoke with patient/family and provided the following preop/admission instructions for upcoming transcatheter valve surgery on 2/3/22 :  · Patient will have Covid testing prior to surgery and should self isolate prior to admiss

## 2022-01-27 NOTE — PROCEDURES
[facility]    PATIENT'S NAME: Lulujustina Palmer   ATTENDING PHYSICIAN: Melissa Arora M.D. OPERATING PHYSICIAN: Melissa Arora M.D.    PATIENT ACCOUNT#:   [de-identified]    LOCATION:  [address]  MEDICAL RECORD #:   [id number]

## 2022-01-28 ENCOUNTER — TELEPHONE (OUTPATIENT)
Dept: FAMILY MEDICINE CLINIC | Facility: CLINIC | Age: 82
End: 2022-01-28

## 2022-01-28 RX ORDER — CLOPIDOGREL BISULFATE 75 MG/1
75 TABLET ORAL DAILY
COMMUNITY

## 2022-01-28 NOTE — TELEPHONE ENCOUNTER
Patient son calling patient just moved back from [place] and will need new rx and refill     atenolol 50 MG Oral Tab   11/13/2017     Sig - Route:  Take 50 mg by mouth daily. - Oral

## 2022-01-28 NOTE — TELEPHONE ENCOUNTER
Pt's son called and is requesting for medication refill of Atenolol 50 mg daily. Please approve or deny pending Rx. Thank you.    LOV: 10/29/21  LF: Not by PCP

## 2022-01-29 ENCOUNTER — LAB ENCOUNTER (OUTPATIENT)
Dept: LAB | Age: 82
DRG: 267 | End: 2022-01-29
Attending: INTERNAL MEDICINE
Payer: MEDICARE

## 2022-01-29 DIAGNOSIS — I25.10 CORONARY ARTERY DISEASE: ICD-10-CM

## 2022-01-30 LAB — SARS-COV-2 RNA RESP QL NAA+PROBE: NOT DETECTED

## 2022-01-30 RX ORDER — ATENOLOL 50 MG/1
50 TABLET ORAL DAILY
Qty: 90 TABLET | Refills: 0 | Status: SHIPPED | OUTPATIENT
Start: 2022-01-30

## 2022-02-01 ENCOUNTER — HOSPITAL ENCOUNTER (INPATIENT)
Dept: INTERVENTIONAL RADIOLOGY/VASCULAR | Facility: HOSPITAL | Age: 82
LOS: 3 days | Discharge: HOME | DRG: 267 | End: 2022-02-04
Attending: INTERNAL MEDICINE | Admitting: INTERNAL MEDICINE
Payer: MEDICARE

## 2022-02-01 DIAGNOSIS — I25.10 CAD (CORONARY ARTERY DISEASE): ICD-10-CM

## 2022-02-01 DIAGNOSIS — I25.10 CORONARY ARTERY DISEASE: Primary | ICD-10-CM

## 2022-02-01 LAB
ATRIAL RATE: 59 BPM
P AXIS: 74 DEGREES
P-R INTERVAL: 192 MS
Q-T INTERVAL: 458 MS
QRS DURATION: 84 MS
QTC CALCULATION (BEZET): 453 MS
R AXIS: 61 DEGREES
T AXIS: 17 DEGREES
VENTRICULAR RATE: 59 BPM

## 2022-02-01 PROCEDURE — 87081 CULTURE SCREEN ONLY: CPT | Performed by: NURSE PRACTITIONER

## 2022-02-01 PROCEDURE — 93005 ELECTROCARDIOGRAM TRACING: CPT

## 2022-02-01 PROCEDURE — 027237Z DILATION OF CORONARY ARTERY, THREE ARTERIES WITH FOUR OR MORE DRUG-ELUTING INTRALUMINAL DEVICES, PERCUTANEOUS APPROACH: ICD-10-PCS | Performed by: INTERNAL MEDICINE

## 2022-02-01 PROCEDURE — B240ZZ3 ULTRASONOGRAPHY OF SINGLE CORONARY ARTERY, INTRAVASCULAR: ICD-10-PCS | Performed by: INTERNAL MEDICINE

## 2022-02-01 PROCEDURE — 99152 MOD SED SAME PHYS/QHP 5/>YRS: CPT

## 2022-02-01 PROCEDURE — 93010 ELECTROCARDIOGRAM REPORT: CPT | Performed by: INTERNAL MEDICINE

## 2022-02-01 PROCEDURE — 92978 ENDOLUMINL IVUS OCT C 1ST: CPT

## 2022-02-01 PROCEDURE — 02703ZZ DILATION OF CORONARY ARTERY, ONE ARTERY, PERCUTANEOUS APPROACH: ICD-10-PCS | Performed by: INTERNAL MEDICINE

## 2022-02-01 PROCEDURE — B2101ZZ FLUOROSCOPY OF SINGLE CORONARY ARTERY USING LOW OSMOLAR CONTRAST: ICD-10-PCS | Performed by: INTERNAL MEDICINE

## 2022-02-01 PROCEDURE — 99153 MOD SED SAME PHYS/QHP EA: CPT

## 2022-02-01 PROCEDURE — 92920 PRQ TRLUML C ANGIOP 1ART&/BR: CPT

## 2022-02-01 RX ORDER — METOPROLOL TARTRATE 5 MG/5ML
INJECTION INTRAVENOUS
Status: COMPLETED
Start: 2022-02-01 | End: 2022-02-01

## 2022-02-01 RX ORDER — ASPIRIN 81 MG/1
TABLET, CHEWABLE ORAL
Status: COMPLETED
Start: 2022-02-01 | End: 2022-02-01

## 2022-02-01 RX ORDER — SODIUM CHLORIDE 9 MG/ML
INJECTION, SOLUTION INTRAVENOUS
Status: COMPLETED | OUTPATIENT
Start: 2022-02-02 | End: 2022-02-01

## 2022-02-01 RX ORDER — MIDAZOLAM HYDROCHLORIDE 1 MG/ML
INJECTION INTRAMUSCULAR; INTRAVENOUS
Status: COMPLETED
Start: 2022-02-01 | End: 2022-02-01

## 2022-02-01 RX ORDER — ATENOLOL 25 MG/1
50 TABLET ORAL DAILY
Status: DISCONTINUED | OUTPATIENT
Start: 2022-02-01 | End: 2022-02-02

## 2022-02-01 RX ORDER — HEPARIN SODIUM 5000 [USP'U]/ML
INJECTION, SOLUTION INTRAVENOUS; SUBCUTANEOUS
Status: COMPLETED
Start: 2022-02-01 | End: 2022-02-01

## 2022-02-01 RX ORDER — NITROGLYCERIN 20 MG/100ML
INJECTION INTRAVENOUS
Status: COMPLETED
Start: 2022-02-01 | End: 2022-02-01

## 2022-02-01 RX ORDER — CLOPIDOGREL BISULFATE 75 MG/1
TABLET ORAL
Status: COMPLETED
Start: 2022-02-01 | End: 2022-02-01

## 2022-02-01 RX ORDER — SODIUM CHLORIDE 9 MG/ML
INJECTION, SOLUTION INTRAVENOUS CONTINUOUS
Status: DISCONTINUED | OUTPATIENT
Start: 2022-02-02 | End: 2022-02-04

## 2022-02-01 RX ORDER — ASPIRIN 81 MG/1
81 TABLET ORAL DAILY
Status: DISCONTINUED | OUTPATIENT
Start: 2022-02-02 | End: 2022-02-03 | Stop reason: HOSPADM

## 2022-02-01 RX ORDER — ASPIRIN 81 MG/1
81 TABLET, CHEWABLE ORAL ONCE
Status: COMPLETED | OUTPATIENT
Start: 2022-02-01 | End: 2022-02-01

## 2022-02-01 RX ORDER — LIDOCAINE HYDROCHLORIDE 10 MG/ML
INJECTION, SOLUTION EPIDURAL; INFILTRATION; INTRACAUDAL; PERINEURAL
Status: COMPLETED
Start: 2022-02-01 | End: 2022-02-01

## 2022-02-01 RX ORDER — CHLORHEXIDINE GLUCONATE 4 G/100ML
30 SOLUTION TOPICAL ONCE
Status: COMPLETED | OUTPATIENT
Start: 2022-02-02 | End: 2022-02-02

## 2022-02-01 RX ORDER — ACETAMINOPHEN 325 MG/1
650 TABLET ORAL EVERY 6 HOURS PRN
Status: DISCONTINUED | OUTPATIENT
Start: 2022-02-01 | End: 2022-02-04

## 2022-02-01 RX ORDER — CLOPIDOGREL BISULFATE 75 MG/1
75 TABLET ORAL DAILY
Status: DISCONTINUED | OUTPATIENT
Start: 2022-02-02 | End: 2022-02-04

## 2022-02-01 RX ORDER — SODIUM CHLORIDE 9 MG/ML
INJECTION, SOLUTION INTRAVENOUS CONTINUOUS
Status: ACTIVE | OUTPATIENT
Start: 2022-02-01 | End: 2022-02-01

## 2022-02-01 RX ORDER — ENALAPRILAT 2.5 MG/2ML
INJECTION INTRAVENOUS
Status: COMPLETED
Start: 2022-02-01 | End: 2022-02-01

## 2022-02-01 RX ADMIN — ASPIRIN 81 MG: 81 TABLET, CHEWABLE ORAL at 09:30:00

## 2022-02-01 RX ADMIN — SODIUM CHLORIDE: 9 INJECTION, SOLUTION INTRAVENOUS at 09:43:00

## 2022-02-01 NOTE — VASCULAR ACCESS
Spoke to pt's RN Brittney, Per RN pt got admitted after Cath supposed to be getting admitted tomorrow for TAVR. Pt has access currently. RN said to place [employer] not today.

## 2022-02-01 NOTE — PROGRESS NOTES
79 y/o female with severe aortic stenosis for TAVR found to have severe RAKESH    RFA  3.5 EBU 80% diag and 95% mid lLAD with diffuse disease prox  Preddil LAD and diag  IVUS severe disease  Minicrush with 2.5 x 16 stent diag  Predil prox LAD with 3.5 NC  3.0 x 38 and 3.5 x 28 and 4.0 x 8 stent  Left main 4.5 x 12 with guidzilla inchworm techniquie  Post dil with 5.0 NC  Mongo to circ and kiss with 3.0 LAD and 3.0 circ  Excellent result  TAVR Thursday

## 2022-02-01 NOTE — PLAN OF CARE
Assumed care of pt at 1215 from cath lab  A&Ox4, up with standby assist and a walker (after recovery period), no complaints of pain  R/A, sinus rivka, no chest pain, no shortness of breath  R groin soft, no hematoma, no other wounds present  Continent of bowel and bladder, last BM 1/31  Fall precautions in place, bed and chair alarm on, call light within reach, pt updated on plan of care, will continue to monitor  Post-PCI EKG completed and in chart                    Problem: CARDIOVASCULAR - ADULT  Goal: Maintains optimal cardiac output and hemodynamic stability  Description: INTERVENTIONS:  - Monitor vital signs, rhythm, and trends  - Monitor for bleeding, hypotension and signs of decreased cardiac output  - Evaluate effectiveness of vasoactive medications to optimize hemodynamic stability  - Monitor arterial and/or venous puncture sites for bleeding and/or hematoma  - Assess quality of pulses, skin color and temperature  - Assess for signs of decreased coronary artery perfusion - ex.  Angina  - Evaluate fluid balance, assess for edema, trend weights  Outcome: Progressing     Problem: RESPIRATORY - ADULT  Goal: Achieves optimal ventilation and oxygenation  Description: INTERVENTIONS:  - Assess for changes in respiratory status  - Assess for changes in mentation and behavior  - Position to facilitate oxygenation and minimize respiratory effort  - Oxygen supplementation based on oxygen saturation or ABGs  - Provide Smoking Cessation handout, if applicable  - Encourage broncho-pulmonary hygiene including cough, deep breathe, Incentive Spirometry  - Assess the need for suctioning and perform as needed  - Assess and instruct to report SOB or any respiratory difficulty  - Respiratory Therapy support as indicated  - Manage/alleviate anxiety  - Monitor for signs/symptoms of CO2 retention  Outcome: Progressing

## 2022-02-01 NOTE — DIETARY NOTE
Pt chart reviewed d/t consult received per cardiac rehab standing order. Pt to be educated by cardiac rehab staff and encouraged to attend outpatient classes taught by SHYANN. RD available PRN.

## 2022-02-02 ENCOUNTER — ANESTHESIA EVENT (OUTPATIENT)
Dept: CARDIAC SURGERY | Facility: HOSPITAL | Age: 82
DRG: 267 | End: 2022-02-02
Payer: MEDICARE

## 2022-02-02 ENCOUNTER — APPOINTMENT (OUTPATIENT)
Dept: GENERAL RADIOLOGY | Facility: HOSPITAL | Age: 82
DRG: 267 | End: 2022-02-02
Attending: NURSE PRACTITIONER
Payer: MEDICARE

## 2022-02-02 LAB
ALBUMIN SERPL-MCNC: 3.1 G/DL (ref 3.4–5)
ALBUMIN/GLOB SERPL: 0.9 {RATIO} (ref 1–2)
ALP LIVER SERPL-CCNC: 242 U/L
ALT SERPL-CCNC: 36 U/L
ANION GAP SERPL CALC-SCNC: 5 MMOL/L (ref 0–18)
ANTIBODY SCREEN: NEGATIVE
AST SERPL-CCNC: 31 U/L (ref 15–37)
BILIRUB SERPL-MCNC: 0.6 MG/DL (ref 0.1–2)
BUN BLD-MCNC: 26 MG/DL (ref 7–18)
CALCIUM BLD-MCNC: 8.9 MG/DL (ref 8.5–10.1)
CHLORIDE SERPL-SCNC: 109 MMOL/L (ref 98–112)
CO2 SERPL-SCNC: 26 MMOL/L (ref 21–32)
CREAT BLD-MCNC: 1.22 MG/DL
ERYTHROCYTE [DISTWIDTH] IN BLOOD BY AUTOMATED COUNT: 14 %
EST. AVERAGE GLUCOSE BLD GHB EST-MCNC: 114 MG/DL (ref 68–126)
GLOBULIN PLAS-MCNC: 3.3 G/DL (ref 2.8–4.4)
GLUCOSE BLD-MCNC: 94 MG/DL (ref 70–99)
HBA1C MFR BLD: 5.6 % (ref ?–5.7)
HCT VFR BLD AUTO: 39.4 %
HGB BLD-MCNC: 12.2 G/DL
INR BLD: 1.07 (ref 0.8–1.2)
MAGNESIUM SERPL-MCNC: 1.8 MG/DL (ref 1.7–2.8)
MCH RBC QN AUTO: 28.9 PG (ref 26–34)
MCHC RBC AUTO-ENTMCNC: 31 G/DL (ref 31–37)
MCV RBC AUTO: 93.4 FL
NT-PROBNP SERPL-MCNC: 880 PG/ML (ref ?–450)
OSMOLALITY SERPL CALC.SUM OF ELEC: 295 MOSM/KG (ref 275–295)
PLATELET # BLD AUTO: 193 10(3)UL (ref 150–450)
POTASSIUM SERPL-SCNC: 4.7 MMOL/L (ref 3.5–5.1)
PROT SERPL-MCNC: 6.4 G/DL (ref 6.4–8.2)
PROTHROMBIN TIME: 13.9 SECONDS (ref 11.6–14.8)
RBC # BLD AUTO: 4.22 X10(6)UL
RH BLOOD TYPE: NEGATIVE
SODIUM SERPL-SCNC: 140 MMOL/L (ref 136–145)
WBC # BLD AUTO: 7.4 X10(3) UL (ref 4–11)

## 2022-02-02 PROCEDURE — 83735 ASSAY OF MAGNESIUM: CPT | Performed by: NURSE PRACTITIONER

## 2022-02-02 PROCEDURE — 80053 COMPREHEN METABOLIC PANEL: CPT | Performed by: NURSE PRACTITIONER

## 2022-02-02 PROCEDURE — 71045 X-RAY EXAM CHEST 1 VIEW: CPT | Performed by: NURSE PRACTITIONER

## 2022-02-02 PROCEDURE — 83036 HEMOGLOBIN GLYCOSYLATED A1C: CPT | Performed by: NURSE PRACTITIONER

## 2022-02-02 PROCEDURE — 83880 ASSAY OF NATRIURETIC PEPTIDE: CPT | Performed by: NURSE PRACTITIONER

## 2022-02-02 PROCEDURE — 86901 BLOOD TYPING SEROLOGIC RH(D): CPT | Performed by: NURSE PRACTITIONER

## 2022-02-02 PROCEDURE — 76937 US GUIDE VASCULAR ACCESS: CPT

## 2022-02-02 PROCEDURE — 86850 RBC ANTIBODY SCREEN: CPT | Performed by: NURSE PRACTITIONER

## 2022-02-02 PROCEDURE — 85610 PROTHROMBIN TIME: CPT | Performed by: NURSE PRACTITIONER

## 2022-02-02 PROCEDURE — 36410 VNPNXR 3YR/> PHY/QHP DX/THER: CPT

## 2022-02-02 PROCEDURE — 85027 COMPLETE CBC AUTOMATED: CPT | Performed by: INTERNAL MEDICINE

## 2022-02-02 PROCEDURE — 86920 COMPATIBILITY TEST SPIN: CPT

## 2022-02-02 PROCEDURE — 86900 BLOOD TYPING SEROLOGIC ABO: CPT | Performed by: NURSE PRACTITIONER

## 2022-02-02 RX ORDER — HYDRALAZINE HYDROCHLORIDE 20 MG/ML
10 INJECTION INTRAMUSCULAR; INTRAVENOUS EVERY 6 HOURS PRN
Status: DISCONTINUED | OUTPATIENT
Start: 2022-02-02 | End: 2022-02-02

## 2022-02-02 RX ORDER — HYDRALAZINE HYDROCHLORIDE 20 MG/ML
10 INJECTION INTRAMUSCULAR; INTRAVENOUS EVERY 4 HOURS PRN
Status: DISCONTINUED | OUTPATIENT
Start: 2022-02-02 | End: 2022-02-04

## 2022-02-02 RX ADMIN — CHLORHEXIDINE GLUCONATE 30 ML: 4 SOLUTION TOPICAL at 21:00:00

## 2022-02-02 RX ADMIN — HYDRALAZINE HYDROCHLORIDE 10 MG: 20 INJECTION INTRAMUSCULAR; INTRAVENOUS at 12:32:00

## 2022-02-02 RX ADMIN — Medication 25 MG: at 20:15:00

## 2022-02-02 RX ADMIN — ASPIRIN 81 MG: 81 TABLET ORAL at 12:32:00

## 2022-02-02 RX ADMIN — CLOPIDOGREL BISULFATE 75 MG: 75 TABLET ORAL at 12:32:00

## 2022-02-02 RX ADMIN — ACETAMINOPHEN 650 MG: 325 TABLET ORAL at 21:45:00

## 2022-02-02 NOTE — PLAN OF CARE
Received patient, alert and oriented. Denied chest pain, denied SOB at rest. Tonya still has SOB with exertion at times. Right groin soft with clean, dry and intact dressing. Discussed POC. Due meds given. Safety measures reinforced, call light within reach. Bed alarm on. Needs attended to. Will continue to monitor. Problem: CARDIOVASCULAR - ADULT  Goal: Maintains optimal cardiac output and hemodynamic stability  Description: INTERVENTIONS:  - Monitor vital signs, rhythm, and trends  - Monitor for bleeding, hypotension and signs of decreased cardiac output  - Evaluate effectiveness of vasoactive medications to optimize hemodynamic stability  - Monitor arterial and/or venous puncture sites for bleeding and/or hematoma  - Assess quality of pulses, skin color and temperature  - Assess for signs of decreased coronary artery perfusion - ex.  Angina  - Evaluate fluid balance, assess for edema, trend weights  Outcome: Progressing     Problem: RESPIRATORY - ADULT  Goal: Achieves optimal ventilation and oxygenation  Description: INTERVENTIONS:  - Assess for changes in respiratory status  - Assess for changes in mentation and behavior  - Position to facilitate oxygenation and minimize respiratory effort  - Oxygen supplementation based on oxygen saturation or ABGs  - Provide Smoking Cessation handout, if applicable  - Encourage broncho-pulmonary hygiene including cough, deep breathe, Incentive Spirometry  - Assess the need for suctioning and perform as needed  - Assess and instruct to report SOB or any respiratory difficulty  - Respiratory Therapy support as indicated  - Manage/alleviate anxiety  - Monitor for signs/symptoms of CO2 retention  Outcome: Progressing

## 2022-02-03 ENCOUNTER — APPOINTMENT (OUTPATIENT)
Dept: CV DIAGNOSTICS | Facility: HOSPITAL | Age: 82
DRG: 267 | End: 2022-02-03
Attending: INTERNAL MEDICINE
Payer: MEDICARE

## 2022-02-03 ENCOUNTER — ANESTHESIA (OUTPATIENT)
Dept: CARDIAC SURGERY | Facility: HOSPITAL | Age: 82
DRG: 267 | End: 2022-02-03
Payer: MEDICARE

## 2022-02-03 ENCOUNTER — APPOINTMENT (OUTPATIENT)
Dept: GENERAL RADIOLOGY | Facility: HOSPITAL | Age: 82
DRG: 267 | End: 2022-02-03
Attending: NURSE PRACTITIONER
Payer: MEDICARE

## 2022-02-03 LAB
ALBUMIN SERPL-MCNC: 3 G/DL (ref 3.4–5)
ALBUMIN/GLOB SERPL: 0.8 {RATIO} (ref 1–2)
ALP LIVER SERPL-CCNC: 219 U/L
ALT SERPL-CCNC: 29 U/L
ANION GAP SERPL CALC-SCNC: 4 MMOL/L (ref 0–18)
ANION GAP SERPL CALC-SCNC: 5 MMOL/L (ref 0–18)
APTT PPP: 120.6 SECONDS (ref 23.3–35.6)
AST SERPL-CCNC: 23 U/L (ref 15–37)
ATRIAL RATE: 73 BPM
BASE EXCESS BLD CALC-SCNC: 0 MMOL/L
BILIRUB SERPL-MCNC: 0.5 MG/DL (ref 0.1–2)
BLOOD TYPE BARCODE: 9500
BUN BLD-MCNC: 30 MG/DL (ref 7–18)
BUN BLD-MCNC: 32 MG/DL (ref 7–18)
CA-I BLD-SCNC: 1.17 MMOL/L (ref 1.12–1.32)
CALCIUM BLD-MCNC: 8.4 MG/DL (ref 8.5–10.1)
CALCIUM BLD-MCNC: 9.2 MG/DL (ref 8.5–10.1)
CHLORIDE SERPL-SCNC: 110 MMOL/L (ref 98–112)
CHLORIDE SERPL-SCNC: 112 MMOL/L (ref 98–112)
CO2 BLD-SCNC: 26 MMOL/L (ref 22–32)
CO2 SERPL-SCNC: 23 MMOL/L (ref 21–32)
CO2 SERPL-SCNC: 25 MMOL/L (ref 21–32)
CREAT BLD-MCNC: 1.27 MG/DL
CREAT BLD-MCNC: 1.41 MG/DL
ERYTHROCYTE [DISTWIDTH] IN BLOOD BY AUTOMATED COUNT: 14.1 %
GLOBULIN PLAS-MCNC: 3.6 G/DL (ref 2.8–4.4)
GLUCOSE BLD-MCNC: 104 MG/DL (ref 70–99)
GLUCOSE BLD-MCNC: 107 MG/DL (ref 70–99)
GLUCOSE BLD-MCNC: 117 MG/DL (ref 70–99)
HCO3 BLD-SCNC: 24.7 MEQ/L
HCT VFR BLD AUTO: 38 %
HCT VFR BLD CALC: 32 %
HGB BLD-MCNC: 12.1 G/DL
INR BLD: 1.28 (ref 0.8–1.2)
ISTAT ACTIVATED CLOTTING TIME: 130 SECONDS (ref 74–137)
MAGNESIUM SERPL-MCNC: 1.6 MG/DL (ref 1.7–2.8)
MCH RBC QN AUTO: 29.5 PG (ref 26–34)
MCHC RBC AUTO-ENTMCNC: 31.8 G/DL (ref 31–37)
MCV RBC AUTO: 92.7 FL
OSMOLALITY SERPL CALC.SUM OF ELEC: 295 MOSM/KG (ref 275–295)
OSMOLALITY SERPL CALC.SUM OF ELEC: 297 MOSM/KG (ref 275–295)
P AXIS: 73 DEGREES
P-R INTERVAL: 180 MS
PCO2 BLD: 41.4 MMHG
PH BLD: 7.38 [PH]
PLATELET # BLD AUTO: 176 10(3)UL (ref 150–450)
PO2 BLD: 401 MMHG
POTASSIUM BLD-SCNC: 4 MMOL/L (ref 3.6–5.1)
POTASSIUM SERPL-SCNC: 4.6 MMOL/L (ref 3.5–5.1)
PROT SERPL-MCNC: 6.6 G/DL (ref 6.4–8.2)
PROTHROMBIN TIME: 16 SECONDS (ref 11.6–14.8)
Q-T INTERVAL: 466 MS
QRS DURATION: 106 MS
QTC CALCULATION (BEZET): 513 MS
R AXIS: 67 DEGREES
RBC # BLD AUTO: 4.1 X10(6)UL
SAO2 % BLD: 100 %
SODIUM SERPL-SCNC: 139 MMOL/L (ref 136–145)
SODIUM SERPL-SCNC: 140 MMOL/L (ref 136–145)
T AXIS: 38 DEGREES
VENTRICULAR RATE: 73 BPM
WBC # BLD AUTO: 7.6 X10(3) UL (ref 4–11)

## 2022-02-03 PROCEDURE — 84132 ASSAY OF SERUM POTASSIUM: CPT

## 2022-02-03 PROCEDURE — 76942 ECHO GUIDE FOR BIOPSY: CPT | Performed by: INTERNAL MEDICINE

## 2022-02-03 PROCEDURE — 84295 ASSAY OF SERUM SODIUM: CPT

## 2022-02-03 PROCEDURE — 85027 COMPLETE CBC AUTOMATED: CPT | Performed by: NURSE PRACTITIONER

## 2022-02-03 PROCEDURE — 83735 ASSAY OF MAGNESIUM: CPT | Performed by: NURSE PRACTITIONER

## 2022-02-03 PROCEDURE — 85347 COAGULATION TIME ACTIVATED: CPT

## 2022-02-03 PROCEDURE — 71045 X-RAY EXAM CHEST 1 VIEW: CPT | Performed by: NURSE PRACTITIONER

## 2022-02-03 PROCEDURE — 80053 COMPREHEN METABOLIC PANEL: CPT | Performed by: NURSE PRACTITIONER

## 2022-02-03 PROCEDURE — 82330 ASSAY OF CALCIUM: CPT

## 2022-02-03 PROCEDURE — 85730 THROMBOPLASTIN TIME PARTIAL: CPT | Performed by: NURSE PRACTITIONER

## 2022-02-03 PROCEDURE — 85014 HEMATOCRIT: CPT

## 2022-02-03 PROCEDURE — 82803 BLOOD GASES ANY COMBINATION: CPT

## 2022-02-03 PROCEDURE — 80048 BASIC METABOLIC PNL TOTAL CA: CPT | Performed by: NURSE PRACTITIONER

## 2022-02-03 PROCEDURE — 93010 ELECTROCARDIOGRAM REPORT: CPT | Performed by: INTERNAL MEDICINE

## 2022-02-03 PROCEDURE — 93005 ELECTROCARDIOGRAM TRACING: CPT

## 2022-02-03 PROCEDURE — 02RF38Z REPLACEMENT OF AORTIC VALVE WITH ZOOPLASTIC TISSUE, PERCUTANEOUS APPROACH: ICD-10-PCS | Performed by: INTERNAL MEDICINE

## 2022-02-03 PROCEDURE — B24BZZ4 ULTRASONOGRAPHY OF HEART WITH AORTA, TRANSESOPHAGEAL: ICD-10-PCS | Performed by: INTERNAL MEDICINE

## 2022-02-03 PROCEDURE — 85610 PROTHROMBIN TIME: CPT | Performed by: NURSE PRACTITIONER

## 2022-02-03 PROCEDURE — B310YZZ FLUOROSCOPY OF THORACIC AORTA USING OTHER CONTRAST: ICD-10-PCS | Performed by: INTERNAL MEDICINE

## 2022-02-03 PROCEDURE — 93355 ECHO TRANSESOPHAGEAL (TEE): CPT | Performed by: INTERNAL MEDICINE

## 2022-02-03 RX ORDER — FAMOTIDINE 20 MG/1
20 TABLET, FILM COATED ORAL 2 TIMES DAILY
Status: DISCONTINUED | OUTPATIENT
Start: 2022-02-03 | End: 2022-02-04

## 2022-02-03 RX ORDER — FAMOTIDINE 10 MG/ML
20 INJECTION, SOLUTION INTRAVENOUS 2 TIMES DAILY
Status: DISCONTINUED | OUTPATIENT
Start: 2022-02-03 | End: 2022-02-04

## 2022-02-03 RX ORDER — METOCLOPRAMIDE HYDROCHLORIDE 5 MG/ML
10 INJECTION INTRAMUSCULAR; INTRAVENOUS EVERY 8 HOURS PRN
Status: DISCONTINUED | OUTPATIENT
Start: 2022-02-03 | End: 2022-02-04

## 2022-02-03 RX ORDER — ONDANSETRON 2 MG/ML
4 INJECTION INTRAMUSCULAR; INTRAVENOUS AS NEEDED
Status: DISPENSED | OUTPATIENT
Start: 2022-02-03 | End: 2022-02-03

## 2022-02-03 RX ORDER — PHENYLEPHRINE HCL 10 MG/ML
VIAL (ML) INJECTION AS NEEDED
Status: DISCONTINUED | OUTPATIENT
Start: 2022-02-03 | End: 2022-02-03 | Stop reason: SURG

## 2022-02-03 RX ORDER — CEFAZOLIN SODIUM/WATER 2 G/20 ML
2 SYRINGE (ML) INTRAVENOUS EVERY 8 HOURS
Status: COMPLETED | OUTPATIENT
Start: 2022-02-03 | End: 2022-02-04

## 2022-02-03 RX ORDER — BISACODYL 10 MG
10 SUPPOSITORY, RECTAL RECTAL
Status: DISCONTINUED | OUTPATIENT
Start: 2022-02-03 | End: 2022-02-04

## 2022-02-03 RX ORDER — HEPARIN SODIUM 1000 [USP'U]/ML
INJECTION, SOLUTION INTRAVENOUS; SUBCUTANEOUS AS NEEDED
Status: DISCONTINUED | OUTPATIENT
Start: 2022-02-03 | End: 2022-02-03 | Stop reason: SURG

## 2022-02-03 RX ORDER — CEFAZOLIN SODIUM/WATER 2 G/20 ML
SYRINGE (ML) INTRAVENOUS AS NEEDED
Status: DISCONTINUED | OUTPATIENT
Start: 2022-02-03 | End: 2022-02-03 | Stop reason: SURG

## 2022-02-03 RX ORDER — NITROGLYCERIN 20 MG/100ML
INJECTION INTRAVENOUS CONTINUOUS PRN
Status: DISCONTINUED | OUTPATIENT
Start: 2022-02-03 | End: 2022-02-04

## 2022-02-03 RX ORDER — SODIUM CHLORIDE, SODIUM LACTATE, POTASSIUM CHLORIDE, CALCIUM CHLORIDE 600; 310; 30; 20 MG/100ML; MG/100ML; MG/100ML; MG/100ML
INJECTION, SOLUTION INTRAVENOUS CONTINUOUS
Status: DISCONTINUED | OUTPATIENT
Start: 2022-02-03 | End: 2022-02-04

## 2022-02-03 RX ORDER — LIDOCAINE HYDROCHLORIDE 10 MG/ML
INJECTION, SOLUTION EPIDURAL; INFILTRATION; INTRACAUDAL; PERINEURAL AS NEEDED
Status: DISCONTINUED | OUTPATIENT
Start: 2022-02-03 | End: 2022-02-03 | Stop reason: SURG

## 2022-02-03 RX ORDER — ALBUMIN, HUMAN INJ 5% 5 %
250 SOLUTION INTRAVENOUS ONCE AS NEEDED
Status: ACTIVE | OUTPATIENT
Start: 2022-02-03 | End: 2022-02-03

## 2022-02-03 RX ORDER — SODIUM CHLORIDE 9 MG/ML
INJECTION, SOLUTION INTRAVENOUS CONTINUOUS
Status: DISCONTINUED | OUTPATIENT
Start: 2022-02-03 | End: 2022-02-04

## 2022-02-03 RX ORDER — ROCURONIUM BROMIDE 10 MG/ML
INJECTION, SOLUTION INTRAVENOUS AS NEEDED
Status: DISCONTINUED | OUTPATIENT
Start: 2022-02-03 | End: 2022-02-03 | Stop reason: SURG

## 2022-02-03 RX ORDER — NALOXONE HYDROCHLORIDE 0.4 MG/ML
80 INJECTION, SOLUTION INTRAMUSCULAR; INTRAVENOUS; SUBCUTANEOUS AS NEEDED
Status: ACTIVE | OUTPATIENT
Start: 2022-02-03 | End: 2022-02-03

## 2022-02-03 RX ORDER — SENNOSIDES 8.6 MG
17.2 TABLET ORAL NIGHTLY PRN
Status: DISCONTINUED | OUTPATIENT
Start: 2022-02-03 | End: 2022-02-04

## 2022-02-03 RX ORDER — DOBUTAMINE HYDROCHLORIDE 200 MG/100ML
INJECTION INTRAVENOUS CONTINUOUS PRN
Status: DISCONTINUED | OUTPATIENT
Start: 2022-02-03 | End: 2022-02-04

## 2022-02-03 RX ORDER — ONDANSETRON 2 MG/ML
4 INJECTION INTRAMUSCULAR; INTRAVENOUS EVERY 6 HOURS PRN
Status: DISCONTINUED | OUTPATIENT
Start: 2022-02-03 | End: 2022-02-04

## 2022-02-03 RX ORDER — PROTAMINE SULFATE 10 MG/ML
INJECTION, SOLUTION INTRAVENOUS AS NEEDED
Status: DISCONTINUED | OUTPATIENT
Start: 2022-02-03 | End: 2022-02-03 | Stop reason: SURG

## 2022-02-03 RX ORDER — SODIUM PHOSPHATE, DIBASIC AND SODIUM PHOSPHATE, MONOBASIC 7; 19 G/133ML; G/133ML
1 ENEMA RECTAL ONCE AS NEEDED
Status: DISCONTINUED | OUTPATIENT
Start: 2022-02-03 | End: 2022-02-04

## 2022-02-03 RX ORDER — POLYETHYLENE GLYCOL 3350 17 G/17G
17 POWDER, FOR SOLUTION ORAL DAILY PRN
Status: DISCONTINUED | OUTPATIENT
Start: 2022-02-03 | End: 2022-02-04

## 2022-02-03 RX ORDER — HYDROMORPHONE HYDROCHLORIDE 1 MG/ML
0.4 INJECTION, SOLUTION INTRAMUSCULAR; INTRAVENOUS; SUBCUTANEOUS EVERY 5 MIN PRN
Status: ACTIVE | OUTPATIENT
Start: 2022-02-03 | End: 2022-02-03

## 2022-02-03 RX ADMIN — HEPARIN SODIUM 18000 UNITS: 1000 INJECTION, SOLUTION INTRAVENOUS; SUBCUTANEOUS at 12:03:00

## 2022-02-03 RX ADMIN — PHENYLEPHRINE HCL 50 MCG: 10 MG/ML VIAL (ML) INJECTION at 11:54:00

## 2022-02-03 RX ADMIN — CEFAZOLIN SODIUM/WATER 2 G: 2 G/20 ML SYRINGE (ML) INTRAVENOUS at 11:12:00

## 2022-02-03 RX ADMIN — SODIUM CHLORIDE: 9 INJECTION, SOLUTION INTRAVENOUS at 10:32:00

## 2022-02-03 RX ADMIN — HYDRALAZINE HYDROCHLORIDE 10 MG: 20 INJECTION INTRAMUSCULAR; INTRAVENOUS at 13:20:00

## 2022-02-03 RX ADMIN — ACETAMINOPHEN 650 MG: 325 TABLET ORAL at 20:15:00

## 2022-02-03 RX ADMIN — CLOPIDOGREL BISULFATE 75 MG: 75 TABLET ORAL at 15:34:00

## 2022-02-03 RX ADMIN — LIDOCAINE HYDROCHLORIDE 50 MG: 10 INJECTION, SOLUTION EPIDURAL; INFILTRATION; INTRACAUDAL; PERINEURAL at 10:58:00

## 2022-02-03 RX ADMIN — CEFAZOLIN SODIUM/WATER 2 G: 2 G/20 ML SYRINGE (ML) INTRAVENOUS at 20:18:00

## 2022-02-03 RX ADMIN — Medication 25 MG: at 18:30:00

## 2022-02-03 RX ADMIN — ROCURONIUM BROMIDE 50 MG: 10 INJECTION, SOLUTION INTRAVENOUS at 11:00:00

## 2022-02-03 RX ADMIN — ONDANSETRON 4 MG: 2 INJECTION INTRAMUSCULAR; INTRAVENOUS at 13:19:00

## 2022-02-03 RX ADMIN — SODIUM CHLORIDE: 9 INJECTION, SOLUTION INTRAVENOUS at 13:19:00

## 2022-02-03 RX ADMIN — PROTAMINE SULFATE 50 MG: 10 INJECTION, SOLUTION INTRAVENOUS at 12:29:00

## 2022-02-03 RX ADMIN — Medication 25 MG: at 06:25:00

## 2022-02-03 RX ADMIN — FAMOTIDINE 20 MG: 20 TABLET, FILM COATED ORAL at 20:15:00

## 2022-02-03 RX ADMIN — SODIUM CHLORIDE: 9 INJECTION, SOLUTION INTRAVENOUS at 02:30:00

## 2022-02-03 NOTE — PLAN OF CARE
Received patient, alert and oriented. Denied chest pain, denied SOB. Discussed POC. Due meds given. Reminded NPO P MN. Safety measures  Reinforced, call light within reach. Bed alarm on. Needs attended to. Will continue to monitor. Problem: CARDIOVASCULAR - ADULT  Goal: Maintains optimal cardiac output and hemodynamic stability  Description: INTERVENTIONS:  - Monitor vital signs, rhythm, and trends  - Monitor for bleeding, hypotension and signs of decreased cardiac output  - Evaluate effectiveness of vasoactive medications to optimize hemodynamic stability  - Monitor arterial and/or venous puncture sites for bleeding and/or hematoma  - Assess quality of pulses, skin color and temperature  - Assess for signs of decreased coronary artery perfusion - ex.  Angina  - Evaluate fluid balance, assess for edema, trend weights  Outcome: Progressing     Problem: RESPIRATORY - ADULT  Goal: Achieves optimal ventilation and oxygenation  Description: INTERVENTIONS:  - Assess for changes in respiratory status  - Assess for changes in mentation and behavior  - Position to facilitate oxygenation and minimize respiratory effort  - Oxygen supplementation based on oxygen saturation or ABGs  - Provide Smoking Cessation handout, if applicable  - Encourage broncho-pulmonary hygiene including cough, deep breathe, Incentive Spirometry  - Assess the need for suctioning and perform as needed  - Assess and instruct to report SOB or any respiratory difficulty  - Respiratory Therapy support as indicated  - Manage/alleviate anxiety  - Monitor for signs/symptoms of CO2 retention  Outcome: Progressing

## 2022-02-03 NOTE — PROCEDURES
Cardiology Transesophageal Echo Note    PRE and POST PROCEDURE DIAGNOSIS:   1. Aortic stenosis, severe and symptomatic. PROCEDURE: Transesophageal Echocardiogram (MARTHA) - intra-operative during TAVR (transfemoral approach). The patient was already intubated and sedated by anesthesiologist. MARTHA probe placed by Dr. Cassi Montes. Mid-esophageal level and transesophageal views were obtained and reviewed. Gastric views were obtained. The patient was stable hemodynamically and tolerated procedure very well. No immediate post procedure complications. Findings:  Hyperdynamic LVSF with estimated LVEF 70-75% with no regional wall motion abnormalities. RV size was normal and with preserved systolic function. Mild mitral insufficiency. Velocities 30 were seen in the HEIDY. There was no evidence for intracardiac mass or thrombus in the HEIDY. Heavily calcified aortic valve with severely reduced cusp separation with mild regurgitation. No significant pericardial effusion with prominent epicardial fat. Measurements:  - aortic valve annulus = approximately 2.2 cm  - mean gradient = 25 mmHg  - peak velocity = 3.7 m/sec    Intra-procedural:  Aortic valve was crossed with a guidewire and no pre-balloon valvuloplasty was performed. Then, a 26 mm Medtronic Evolut Pro valve was positioned across the aortic valve from transfemoral approach.    Valve centering position was confirmed by fluoro and MARTHA imaging and bioprosthetic valve was deployed while heart was paced fast.    Post valve deployment:  - no central aortic regurgitation  - No perivalvular regurgitation   - No change in LV systolic function or regional wall motion abnormalities from baseline  - new mean gradient 4 mmHg and peak velocity 1.7 m/s  - Mild-moderate mitral regurgitation that is slighly more than baseline  - no thoracic aorta root dissection  - widely opening new bioprosthetic aortic valve leaflets  - no annular disruption  - no significant pericardial effusion with prominent epicardial fat that is unchanged from baseline      Impression:  - A successful placement of a 26 mm Medtronic Evolut Pro bioprosthetic aortic valve using transfemoral approach for symptomatic aortic stenosis. There was no central aortic regurgitation and no binh-valvular regurgitation.     Alejandro Pichardo MD  2/3/2022  1:41 PM

## 2022-02-03 NOTE — ANESTHESIA POSTPROCEDURE EVALUATION
[facility] Patient Status:  Inpatient   Age/Gender 80year old female MRN [id number]   [facility] 6NE-A Attending Darien Wilson MD   Hosp Day # 2 PCP Koby Lantigua MD       Anesthesia Post-op Note    TRANSCATHETER AORTIC VALVE REPLACEMENT FEMORAL APPROACH, 26MM EVOLUTE    Procedure Summary     Date: 02/03/22 Room / Location: [address] [place] [address]    Anesthesia Start: 5133 Anesthesia Stop: 1303    Procedure: TRANSCATHETER AORTIC VALVE REPLACEMENT FEMORAL APPROACH, 26MM EVOLUTE (N/A ) Diagnosis: (aortic stenosis)    Surgeons: Kimberley Fraga MD Anesthesiologist: Kimberley Fraga MD    Anesthesia Type: general ASA Status: 4          Anesthesia Type: general    Vitals Value Taken Time   /64 02/03/22 1303   Temp 97.4 02/03/22 1303   Pulse 85 02/03/22 1303   Resp 19 02/03/22 1303   SpO2 99 02/03/22 1303       Patient Location: ICU    Anesthesia Type: general    Airway Patency: patent    Postop Pain Control: adequate    Mental Status: mildly sedated but able to meaningfully participate in the post-anesthesia evaluation    Nausea/Vomiting: none    Cardiopulmonary/Hydration status: stable euvolemic    Complications: no apparent anesthesia related complications    Postop vital signs: stable    Dental Exam: Unchanged from Preop    Sign out given to ICU staff.

## 2022-02-03 NOTE — ANESTHESIA PROCEDURE NOTES
Airway  Date/Time: 2/3/2022 11:02 AM  Urgency: elective      General Information and Staff    Patient location during procedure: OR  Anesthesiologist: Asia Coffman MD  Performed: anesthesiologist     Indications and Patient Condition  Indications for airway management: anesthesia  Sedation level: deep  Preoxygenated: yes  Patient position: sniffing  Mask difficulty assessment: 1 - vent by mask    Final Airway Details  Final airway type: endotracheal airway      Successful airway: ETT  Cuffed: yes   Successful intubation technique: direct laryngoscopy  Endotracheal tube insertion site: oral  Blade: Bonnie  Blade size: #3  ETT size (mm): 7.0    Cormack-Lehane Classification: grade I - full view of glottis  Placement verified by: chest auscultation and capnometry   Cuff volume (mL): 7  Measured from: lips  ETT to lips (cm): 22  Number of attempts at approach: 1    Additional Comments  Atraumatic

## 2022-02-03 NOTE — ANESTHESIA PROCEDURE NOTES
Peripheral IV  Inserted by: Asia Coffman MD    Placement  Needle size: 18 G  Laterality: left  Location: antecubital  Local anesthetic: none  Site prep: alcohol  Technique: ultrasound guided  Attempts: 1

## 2022-02-03 NOTE — ANESTHESIA PROCEDURE NOTES
Arterial Line  Performed by: Debbi Lo MD  Authorized by: Debbi Lo MD     General Information and Staff    Procedure Start:  2/3/2022 10:45 AM  Procedure End:  2/3/2022 10:47 AM  Anesthesiologist:  Debbi Lo MD  Performed By:  Anesthesiologist  Patient Location:  OR  Indication: continuous blood pressure monitoring and blood sampling needed    Site Identification: real time ultrasound guided, surface landmarks and image stored and retrievable    Preanesthetic Checklist: 2 patient identifiers, IV checked, risks and benefits discussed, monitors and equipment checked, pre-op evaluation, timeout performed, anesthesia consent and sterile technique used    Procedure Details    Catheter Size:  20 G  Catheter Length:  1 and 3/4 inchCatheter Type:  Arrow  Seldinger Technique?: Yes    Laterality:  RightSite:  Radial artery  Site Prep: chlorhexidine  Line Secured:  Wrist Brace, tape and Tegaderm    Assessment    Events: patient tolerated procedure well with no complications      Medications      Additional Comments

## 2022-02-04 ENCOUNTER — APPOINTMENT (OUTPATIENT)
Dept: GENERAL RADIOLOGY | Facility: HOSPITAL | Age: 82
DRG: 267 | End: 2022-02-04
Attending: NURSE PRACTITIONER
Payer: MEDICARE

## 2022-02-04 ENCOUNTER — APPOINTMENT (OUTPATIENT)
Dept: CV DIAGNOSTICS | Facility: HOSPITAL | Age: 82
DRG: 267 | End: 2022-02-04
Attending: NURSE PRACTITIONER
Payer: MEDICARE

## 2022-02-04 VITALS
HEIGHT: 59 IN | DIASTOLIC BLOOD PRESSURE: 48 MMHG | TEMPERATURE: 98 F | SYSTOLIC BLOOD PRESSURE: 136 MMHG | WEIGHT: 189.69 LBS | HEART RATE: 86 BPM | BODY MASS INDEX: 38.24 KG/M2 | RESPIRATION RATE: 23 BRPM | OXYGEN SATURATION: 95 %

## 2022-02-04 PROBLEM — Z95.2 S/P TAVR (TRANSCATHETER AORTIC VALVE REPLACEMENT): Status: ACTIVE | Noted: 2022-02-04

## 2022-02-04 LAB
ANION GAP SERPL CALC-SCNC: 5 MMOL/L (ref 0–18)
BASE EXCESS BLD CALC-SCNC: -15 MMOL/L
BUN BLD-MCNC: 36 MG/DL (ref 7–18)
CA-I BLD-SCNC: 0.81 MMOL/L (ref 1.12–1.32)
CALCIUM BLD-MCNC: 8.5 MG/DL (ref 8.5–10.1)
CHLORIDE SERPL-SCNC: 110 MMOL/L (ref 98–112)
CO2 BLD-SCNC: 12 MMOL/L (ref 22–32)
CO2 SERPL-SCNC: 22 MMOL/L (ref 21–32)
CREAT BLD-MCNC: 1.53 MG/DL
ERYTHROCYTE [DISTWIDTH] IN BLOOD BY AUTOMATED COUNT: 14.3 %
GLUCOSE BLD-MCNC: 119 MG/DL (ref 70–99)
GLUCOSE BLD-MCNC: 65 MG/DL (ref 70–99)
HCO3 BLD-SCNC: 11.6 MEQ/L
HCT VFR BLD AUTO: 35.6 %
HCT VFR BLD CALC: 18 %
HGB BLD-MCNC: 11.6 G/DL
INR BLD: 1.13 (ref 0.8–1.2)
ISTAT ACTIVATED CLOTTING TIME: 309 SECONDS (ref 74–137)
MAGNESIUM SERPL-MCNC: 1.7 MG/DL (ref 1.7–2.8)
MCHC RBC AUTO-ENTMCNC: 32.6 G/DL (ref 31–37)
MCV RBC AUTO: 92 FL
OSMOLALITY SERPL CALC.SUM OF ELEC: 293 MOSM/KG (ref 275–295)
PCO2 BLD: 23 MMHG
PH BLD: 7.31 [PH]
PLATELET # BLD AUTO: 187 10(3)UL (ref 150–450)
PO2 BLD: 58 MMHG
POTASSIUM SERPL-SCNC: 4.7 MMOL/L (ref 3.5–5.1)
PROTHROMBIN TIME: 14.5 SECONDS (ref 11.6–14.8)
RBC # BLD AUTO: 3.87 X10(6)UL
SAO2 % BLD: 88 %
SODIUM BLD-SCNC: 149 MMOL/L (ref 136–145)
SODIUM SERPL-SCNC: 137 MMOL/L (ref 136–145)
WBC # BLD AUTO: 9.7 X10(3) UL (ref 4–11)

## 2022-02-04 PROCEDURE — 97535 SELF CARE MNGMENT TRAINING: CPT

## 2022-02-04 PROCEDURE — 97530 THERAPEUTIC ACTIVITIES: CPT

## 2022-02-04 PROCEDURE — 93306 TTE W/DOPPLER COMPLETE: CPT | Performed by: NURSE PRACTITIONER

## 2022-02-04 PROCEDURE — 83735 ASSAY OF MAGNESIUM: CPT | Performed by: NURSE PRACTITIONER

## 2022-02-04 PROCEDURE — 85610 PROTHROMBIN TIME: CPT | Performed by: NURSE PRACTITIONER

## 2022-02-04 PROCEDURE — 97165 OT EVAL LOW COMPLEX 30 MIN: CPT

## 2022-02-04 PROCEDURE — 80048 BASIC METABOLIC PNL TOTAL CA: CPT | Performed by: NURSE PRACTITIONER

## 2022-02-04 PROCEDURE — 71045 X-RAY EXAM CHEST 1 VIEW: CPT | Performed by: NURSE PRACTITIONER

## 2022-02-04 PROCEDURE — 97116 GAIT TRAINING THERAPY: CPT

## 2022-02-04 PROCEDURE — 85027 COMPLETE CBC AUTOMATED: CPT | Performed by: NURSE PRACTITIONER

## 2022-02-04 PROCEDURE — 93005 ELECTROCARDIOGRAM TRACING: CPT

## 2022-02-04 PROCEDURE — 97162 PT EVAL MOD COMPLEX 30 MIN: CPT

## 2022-02-04 PROCEDURE — 93010 ELECTROCARDIOGRAM REPORT: CPT | Performed by: INTERNAL MEDICINE

## 2022-02-04 RX ADMIN — CLOPIDOGREL BISULFATE 75 MG: 75 TABLET ORAL at 08:35:00

## 2022-02-04 RX ADMIN — ACETAMINOPHEN 650 MG: 325 TABLET ORAL at 16:54:00

## 2022-02-04 RX ADMIN — FAMOTIDINE 20 MG: 20 TABLET, FILM COATED ORAL at 08:35:00

## 2022-02-04 RX ADMIN — CEFAZOLIN SODIUM/WATER 2 G: 2 G/20 ML SYRINGE (ML) INTRAVENOUS at 04:37:00

## 2022-02-04 RX ADMIN — Medication 25 MG: at 06:11:00

## 2022-02-04 NOTE — CM/SW NOTE
MSW acknowledged the recommendation for [facility]. Referrals sent via Aidin. Awaiting responses. Jermaine Avendano LCSW    ADDENDUM:  [facility]  P:[phone]  P:[phone]. JENNI order entered.

## 2022-02-04 NOTE — PROGRESS NOTES
PT seen by MD, Aox4 GCS 15, denies any pain, discharge to home. Discharge instructions and follow up explained and taught to patient and son. No additional questions or concerns. Pt was assisted into son's vehicle.

## 2022-02-04 NOTE — PROGRESS NOTES
PT arrived to 6618, awake alert and oriented, LARSON PMS x 4 and answering all questions appropriately. C/O back and leg pain which subsided with pain management. blat Fem sites C/D/I no bleeding present. Right radial A line removed due to not functioning/positional.  Bedrest complete at 1730, pt sitting upright resting comfortably. No additional updates to note.

## 2022-02-04 NOTE — PLAN OF CARE
Assumed care for this pt at [address] [place] [address]. Pt alert oriented vss, pt sinus rhythm on tele. Pt denies any pain, bilateral groins soft non tender, no hematomas noted.  Will continue to follow,

## 2022-02-04 NOTE — OPERATIVE REPORT
[facility]    PATIENT'S NAME: Jorge Alberto Gonsalez   ATTENDING PHYSICIAN: Chalino Shrestha M.D. OPERATING PHYSICIAN: Jody Lr M.D. PATIENT ACCOUNT#:   [de-identified]    LOCATION:  [address] [place] [address]  MEDICAL RECORD #:   [id number]       YOB: 1940  ADMISSION DATE:       02/01/2022      OPERATION DATE:  02/03/2022    OPERATIVE REPORT    PREOPERATIVE DIAGNOSIS:    1. Severe symptomatic aortic stenosis. 2.   History of coronary artery disease, status post recent PCI. 3.   Hyperlipidemia. POSTOPERATIVE DIAGNOSIS:    1. Severe symptomatic aortic stenosis. 2.   History of coronary artery disease, status post recent PCI. 3.   Hyperlipidemia. PROCEDURE PERFORMED:  TAVR placement. DESCRIPTION OF PROCEDURE:  After informed consent was obtained, patient was prepped and draped in the usual sterile fashion in the hybrid operating room. Patient was under general anesthesia. Using ultrasound, right common femoral artery was identified and entered directly using a micropuncture needle. A micropuncture sheath followed, followed by placement of a 6-Afghan sheath. Similarly, the right common femoral vein was identified and entered using a micropuncture needle with placement of a 5-Afghan sheath. The left common femoral artery was also entered using direct ultrasound guidance with placement ultimately of a 6-Afghan sheath. A balloon-tipped pacer was placed via the right common femoral vein site into the right ventricle with achievement of good pacing thresholds. A pigtail catheter was advanced via the right common femoral artery site into the noncoronary cusp with imaging obtained to decrease parallax across the annulus. Next, 2 Perclose devices were placed in a pre-close fashion in the left common femoral artery. An 8-Afghan sheath was subsequently placed. Using a stiff wire, a 14-Afghan Omar DrySeal sheath was placed. Heparin was given to maintain ACT over 250 seconds.     Next, an AL1 catheter, along with a straight wire, were used to access the left ventricle. A J-wire followed, followed by placement of a pigtail catheter, and, ultimately, a mini Safari wire was placed in the left ventricular apex. Next, a Medtronic Evolut Pro transcatheter aortic valve 26 mm was prepped. Imaging was obtained to make sure that all the components of the device were loaded appropriately. Once this was done, the 14-Maltese Napoleon DrySeal sheath was removed, and the Evolut Pro device was inserted into the left common femoral artery and brought into the aortic annulus. Once we were happy with positioning, the device was deployed in a well controlled fashion. Subsequent angiography using the pigtail catheter revealed good placement of the valve approximately 2 to 3 mm below both the non-coronary and the left coronary cusp. MARTHA interrogation revealed no significant aortic insufficiency. The delivery device and the wire were removed. The pigtail catheter was removed carefully with the wire. Ascending aortography revealed good placement of the of the device with good flow into both coronary vessels. The procedure was terminated. Delivery device was removed, and the 14-Maltese Napoleon DrySeal sheath was placed back. Subsequently, 2 Perclose devices were reversed to achieve hemostasis in the left common femoral artery site. A Perclose device was placed in the right common femoral artery to achieve excellent hemostasis. Manual compression was held to achieve hemostasis in the right common femoral vein site. There were no apparent acute complications noted, and patient tolerated the procedure well. SUMMARY:  In summary, the patient today underwent placement of an Evolut Pro 26 mm transcatheter aortic valve without acute complications.     Dictated By Yanira Carbone M.D.  d: 02/03/2022 12:34:11  t: 02/03/2022 13:50:01  [facility] [id number]  MA/

## 2022-02-04 NOTE — CARDIAC REHAB
Cardiac rehab education completed with patient  Patient referred to cardiac rehab  Patient declined appt at this time--will call--contact information given

## 2022-02-05 LAB — BLOOD TYPE BARCODE: 9500

## 2022-02-07 ENCOUNTER — PATIENT OUTREACH (OUTPATIENT)
Dept: CASE MANAGEMENT | Age: 82
End: 2022-02-07

## 2022-02-07 LAB
ATRIAL RATE: 72 BPM
P AXIS: 70 DEGREES
P-R INTERVAL: 168 MS
Q-T INTERVAL: 438 MS
QRS DURATION: 106 MS
QTC CALCULATION (BEZET): 479 MS
R AXIS: 41 DEGREES
T AXIS: 26 DEGREES
VENTRICULAR RATE: 72 BPM

## 2022-02-07 PROCEDURE — 1111F DSCHRG MED/CURRENT MED MERGE: CPT

## 2022-02-11 ENCOUNTER — TELEPHONE (OUTPATIENT)
Dept: FAMILY MEDICINE CLINIC | Facility: CLINIC | Age: 82
End: 2022-02-11

## 2022-02-11 NOTE — TELEPHONE ENCOUNTER
Thera Blend from [facility] called ([phone]) to inform you that pt's HR is 48-50s /60 She is currently asymptomatic. She did have 2 large BM's this morning & felt weak for about 1 hour after. He is also requesting  to come talk to pt to give resources so she can get to Cardiac Rehab after TAVER. Pt has OV with you on 2/14  Please advise on any orders for pt.

## 2022-02-14 ENCOUNTER — OFFICE VISIT (OUTPATIENT)
Dept: FAMILY MEDICINE CLINIC | Facility: CLINIC | Age: 82
End: 2022-02-14
Payer: MEDICARE

## 2022-02-14 VITALS
RESPIRATION RATE: 14 BRPM | HEART RATE: 48 BPM | BODY MASS INDEX: 40 KG/M2 | DIASTOLIC BLOOD PRESSURE: 62 MMHG | WEIGHT: 198 LBS | SYSTOLIC BLOOD PRESSURE: 138 MMHG

## 2022-02-14 DIAGNOSIS — R55 NEAR SYNCOPE: Primary | ICD-10-CM

## 2022-02-14 DIAGNOSIS — I25.10 CORONARY ARTERY DISEASE INVOLVING NATIVE CORONARY ARTERY OF NATIVE HEART WITHOUT ANGINA PECTORIS: ICD-10-CM

## 2022-02-14 DIAGNOSIS — Z95.2 S/P TAVR (TRANSCATHETER AORTIC VALVE REPLACEMENT): ICD-10-CM

## 2022-02-14 DIAGNOSIS — I35.0 NONRHEUMATIC AORTIC VALVE STENOSIS: ICD-10-CM

## 2022-02-14 PROBLEM — E66.01 MORBID (SEVERE) OBESITY DUE TO EXCESS CALORIES (HCC): Status: ACTIVE | Noted: 2022-02-14

## 2022-02-14 PROCEDURE — 93000 ELECTROCARDIOGRAM COMPLETE: CPT | Performed by: EMERGENCY MEDICINE

## 2022-02-14 PROCEDURE — 99495 TRANSJ CARE MGMT MOD F2F 14D: CPT | Performed by: EMERGENCY MEDICINE

## 2022-02-14 PROCEDURE — 1111F DSCHRG MED/CURRENT MED MERGE: CPT | Performed by: EMERGENCY MEDICINE

## 2022-02-16 ENCOUNTER — MED REC SCAN ONLY (OUTPATIENT)
Dept: FAMILY MEDICINE CLINIC | Facility: CLINIC | Age: 82
End: 2022-02-16

## 2022-02-21 ENCOUNTER — TELEPHONE (OUTPATIENT)
Dept: FAMILY MEDICINE CLINIC | Facility: CLINIC | Age: 82
End: 2022-02-21

## 2022-02-21 NOTE — TELEPHONE ENCOUNTER
Received call from Physical therapist at [facility] that on the 17th of February that the patients heart rate WAS 48, and blood pressure was 141/71. Could not verify if nursing had been in to see her, she does however see nursing. Also, patient tends to run a very low heart rate. PT was not at patients home when making call.

## 2022-03-01 ENCOUNTER — TELEPHONE (OUTPATIENT)
Dept: FAMILY MEDICINE CLINIC | Facility: CLINIC | Age: 82
End: 2022-03-01

## 2022-03-01 RX ORDER — ATENOLOL 25 MG/1
25 TABLET ORAL DAILY
Refills: 0 | COMMUNITY
Start: 2022-03-01

## 2022-03-01 NOTE — TELEPHONE ENCOUNTER
Spoke with Nichole Rodrigues, a RN at [place] [address]. Nichole Rodrigues calling the office to inform PCP that cardiologist, Dr. Alek Pagan, changed dosage of atenolol. Atenolol changed to 25mg daily. Medication list updated. Confirmed that patient is still taking ASA, Plavix, and Mobic.  Will be receiving new atenolol rx from cardiologist.

## 2022-03-02 ENCOUNTER — TELEPHONE (OUTPATIENT)
Dept: FAMILY MEDICINE CLINIC | Facility: CLINIC | Age: 82
End: 2022-03-02

## 2022-03-03 RX ORDER — MELOXICAM 15 MG/1
TABLET ORAL
Qty: 30 TABLET | Refills: 0 | Status: SHIPPED | OUTPATIENT
Start: 2022-03-03 | End: 2022-04-04

## 2022-03-05 NOTE — H&P
[facility]    PATIENT'S NAME: Flor Kramer   ATTENDING PHYSICIAN: Skylar Murry M.D. PATIENT ACCOUNT#:   [de-identified]    LOCATION:  [address] [place] [address]  MEDICAL RECORD #:   [id number]       YOB: 1940  ADMISSION DATE:       02/01/2022    HISTORY AND PHYSICAL EXAMINATION    HISTORY OF PRESENT ILLNESS:  An 80-year-old female from [place] who has been homebound for 2 years. Patient has been home because of COVID. Now, increasing fatigue, shortness of breath. She was found to have severe aortic stenosis. PAST MEDICAL HISTORY:  Osteoarthritis. Hypertension. Aortic stenosis. MEDICATIONS:  Atenolol. ALLERGIES:  None. FAMILY HISTORY:  History of myocardial infarction. SOCIAL HISTORY:  She has never smoked. REVIEW OF SYSTEMS:  Denies hemoptysis, denies hematemesis, denies hematuria. The rest of the review of systems is normal except as per HPI. PHYSICAL EXAMINATION:    GENERAL:  No acute distress. VITAL SIGNS:  Blood pressure 110/50, pulse 56. HEENT:  Mucous membranes moist.  NECK:  No JVD at 30 degrees. No thyromegaly. LUNGS:  Clear to auscultation. HEART:  S1, S2, late peaking systolic murmur. ABDOMEN:  Soft. No hepatosplenomegaly. No masses. Nontender. EXTREMITIES:  No clubbing, cyanosis, or edema. BACK:  No kyphosis. IMPRESSION:    1. Severe aortic stenosis. Patient will need to undergo cardiac catheterization prior to TAVR evaluation. Patient will be seen in TAVR clinic. 2.   Hypertension. 3.   History of cellulitis, left hand, after being bitten by a cat.     Dictated By Skylar Murry M.D.  d: 03/05/2022 09:23:34  t: 03/05/2022 11:54:29  Job [id number]  G/

## 2022-03-07 NOTE — PROCEDURES
[facility]    PATIENT'S NAME: Desirae Martin   ATTENDING PHYSICIAN: Dara Carter M.D. OPERATING PHYSICIAN: Dara Carter M.D. PATIENT ACCOUNT#:   [de-identified]    LOCATION:  [address] [place] [address]  MEDICAL RECORD #:   [id number]       YOB: 1940  ADMISSION DATE:       02/01/2022      OPERATION DATE:  02/01/2022    CARDIAC PROCEDURE TRANSCRIPTION    CARDIAC CATHETERIZATION/PERCUTANEOUS CORONARY INTERVENTION    PREOPERATIVE DIAGNOSIS:    POSTOPERATIVE DIAGNOSIS:    PROCEDURE PERFORMED:      INDICATIONS:  An 80-year-old female with severe aortic stenosis and prior TAVR, who was found to have severe coronary disease. Team had met and decided to proceed with intervention prior to her TAVR because of the complexity of the disease. We discussed whether to use support. DESCRIPTION OF PROCEDURE:  The right groin was anesthetized, 7-Monegasque sheath introduced. Three-part view showed an 80% diagonal and 95% mid LAD with diffuse disease proximal all the way to the left main. The LAD was predilated with a 2.5 balloon in diagonal.  IVUS showed severe disease throughout. Decision was made for mini-crush technique given her age. A 2.5 x 16 stent was placed in the diagonal, and mini-crush was performed. The LAD was predilated with a 3.5 NC. A 3.0 x 30, 3.5 x 28, and 4.0 x 8 stent were placed in the LAD. The left main was treated with a 4.5 x 12 with Guidezilla inchworm technique into the LAD. The left main was postdilated with a 5.0 NC. A Mongo was then used to access the circumflex. Kissing balloon was done with a 3.0 in the LAD and 3.0 in the circumflex with excellent result. IMPRESSION:    1. Complex IVUS interventional procedure in the setting of severe aortic stenosis with IVUS-guided stenting of the diagonal with a 2.5 x 16 and stenting of the LAD with 3.0 x 38, 3.5 x 28, and 4.0 x 8 stent. 2.   Stenting of the left main with 4.5 x 12, postdilated with 5.0 NC.   3. Kissing balloon to the circumflex and LAD with 3.0 balloon bilaterally with excellent result. RECOMMENDATIONS:  Patient will undergo TAVR on Thursday.     Dictated By Colonel Halina M.D.  d: 03/05/2022 09:25:59  t: 03/06/2022 01:53:17  [facility] [id number]  MJG/

## 2022-03-08 ENCOUNTER — HOSPITAL ENCOUNTER (OUTPATIENT)
Dept: CARDIOLOGY CLINIC | Facility: HOSPITAL | Age: 82
Discharge: HOME | End: 2022-03-08
Attending: INTERNAL MEDICINE
Payer: MEDICARE

## 2022-03-08 VITALS — DIASTOLIC BLOOD PRESSURE: 58 MMHG | OXYGEN SATURATION: 98 % | SYSTOLIC BLOOD PRESSURE: 147 MMHG | HEART RATE: 54 BPM

## 2022-03-08 DIAGNOSIS — I10 ESSENTIAL HYPERTENSION: Chronic | ICD-10-CM

## 2022-03-08 DIAGNOSIS — Z95.2 S/P TAVR (TRANSCATHETER AORTIC VALVE REPLACEMENT): ICD-10-CM

## 2022-03-08 DIAGNOSIS — I25.10 CORONARY ARTERY DISEASE INVOLVING NATIVE CORONARY ARTERY OF NATIVE HEART WITHOUT ANGINA PECTORIS: ICD-10-CM

## 2022-03-08 PROCEDURE — 99213 OFFICE O/P EST LOW 20 MIN: CPT | Performed by: NURSE PRACTITIONER

## 2022-03-08 NOTE — TELEPHONE ENCOUNTER
Notified the patient's daughter-in-law of the below response by the provider. Patient's daughter-in-law verbalized understanding. Answered all questions at this time.

## 2022-03-08 NOTE — OPERATIVE REPORT
Operative Note    Patient Name: [facility]    Preoperative Diagnosis:   Severe symptomatic aortic stenosis. History of coronary artery disease, status post recent PCI. Hyperlipidemia. Postoperative Diagnosis:   Severe symptomatic aortic stenosis. History of coronary artery disease, status post recent PCI. Hyperlipidemia. Surgeons:    FELIX Baker M.D. Coit Brash MD    Assistant:       PROCEDURE PERFORMED:   Transcatheter aortic valve replacement with 26 Evolut Pro  aortic valve  Left  transfemoral approach percutaneously    CARDIOLOGISTS: Nadia Gant  TRANSESOPHAGEAL ECHOCARDIOGRAPHER: Leland Douglas    INDICATION:     80year old female with severe aortic stenosis. The assessment is that the patient is high risk for open AVR and would be best served by TAVR. This is bases on STS score and co-morbidities. PROCEDURE:  The procedure was performed by Nadia Gant  See additional operative notes from the cardiology team.  The MARTHA probe was placed by anesthesia  The pt was prepped and draped in the usual sterile fashion and a time out performed. A transvenous pacing wire was placed via the right femoral vein. The right femoral artery was used for TAVR insertion and the left femoral for the pigtail catheter. The femoral artery was perclosed. Heparin was given and the ACT was appropriate. The valve was then crossed and an exchange catheter used to place a safari wire in the left ventricle. A second time out was then performed. The 26 Evolut Prowas then advanced and deployed with rapid ventricular pacing. The valve seated well. MARTHA evaluation did not reveal any significant effusion, there was no paravalvular leak. There were no complications evident. There was minimal blood loss. The pt was then transferred to the CVICU in stable condition.        Anesthesia: General    Complications: none    Implants: as above    Specimen: none    Drains: none    Condition: to CVICU    Estimated Blood Loss: Vilma Harris MD

## 2022-03-16 ENCOUNTER — MED REC SCAN ONLY (OUTPATIENT)
Dept: FAMILY MEDICINE CLINIC | Facility: CLINIC | Age: 82
End: 2022-03-16

## 2022-03-28 ENCOUNTER — TELEPHONE (OUTPATIENT)
Dept: FAMILY MEDICINE CLINIC | Facility: CLINIC | Age: 82
End: 2022-03-28

## 2022-03-28 NOTE — TELEPHONE ENCOUNTER
Christiano Martins from [facility] called that pt will be re-certified for [facility] another month until her Cardiac Rehab starts.

## 2022-04-04 RX ORDER — MELOXICAM 15 MG/1
15 TABLET ORAL DAILY
Qty: 90 TABLET | Refills: 0 | Status: SHIPPED | OUTPATIENT
Start: 2022-04-04

## 2022-04-04 RX ORDER — MELOXICAM 15 MG/1
TABLET ORAL
Qty: 30 TABLET | Refills: 0 | Status: SHIPPED | OUTPATIENT
Start: 2022-04-04 | End: 2022-04-04

## 2022-04-04 NOTE — TELEPHONE ENCOUNTER
Patients daughter called and asked if Dr. Homa Dockery will approve more refills for the following medication to prevent from having to call every month to get a refill    MELOXICAM 15 MG Oral Tab      Please Advise. Thank you.

## 2022-04-04 NOTE — TELEPHONE ENCOUNTER
Please see below message & approve or deny pending 90 day Rx for pt. A 30 day with no refills was sent today. LOV 2/14.

## 2022-04-05 ENCOUNTER — TELEPHONE (OUTPATIENT)
Dept: CARDIOLOGY CLINIC | Facility: HOSPITAL | Age: 82
End: 2022-04-05

## 2022-04-20 ENCOUNTER — MED REC SCAN ONLY (OUTPATIENT)
Dept: FAMILY MEDICINE CLINIC | Facility: CLINIC | Age: 82
End: 2022-04-20

## 2022-04-30 ENCOUNTER — TELEPHONE (OUTPATIENT)
Dept: CARDIOLOGY CLINIC | Facility: HOSPITAL | Age: 82
End: 2022-04-30

## 2022-06-02 ENCOUNTER — TELEPHONE (OUTPATIENT)
Dept: FAMILY MEDICINE CLINIC | Facility: CLINIC | Age: 82
End: 2022-06-02

## 2022-06-02 NOTE — TELEPHONE ENCOUNTER
Received a call from residential home health, talked with RN, Wilfrido Epstein who is requesting to re-certify pt for home health for another 2 months until cardiac rehab starts. Pt also is c/o cough for 1 week. Denies fevers, sob, congestion, and sick contacts. Family member requesting for tessalon pearls. Please advise. Thank you.    LOV: 02/14/22

## 2022-06-06 RX ORDER — BENZONATATE 200 MG/1
200 CAPSULE ORAL 3 TIMES DAILY PRN
Qty: 30 CAPSULE | Refills: 0 | Status: SHIPPED | OUTPATIENT
Start: 2022-06-06

## 2022-06-08 ENCOUNTER — MED REC SCAN ONLY (OUTPATIENT)
Dept: FAMILY MEDICINE CLINIC | Facility: CLINIC | Age: 82
End: 2022-06-08

## 2022-06-08 NOTE — TELEPHONE ENCOUNTER
Called RN, Celeste Freeman from [facility] and was informed of below response from provider. RN requesting for this nurse to inform pt. Called pt and was informed of below. All questions answered and pt verbalized understanding.

## 2022-06-20 ENCOUNTER — OFFICE VISIT (OUTPATIENT)
Dept: ORTHOPEDICS CLINIC | Facility: CLINIC | Age: 82
End: 2022-06-20
Payer: MEDICARE

## 2022-06-20 VITALS — OXYGEN SATURATION: 97 % | HEART RATE: 53 BPM

## 2022-06-20 DIAGNOSIS — M17.12 PRIMARY OSTEOARTHRITIS OF LEFT KNEE: Primary | ICD-10-CM

## 2022-06-20 RX ORDER — TRIAMCINOLONE ACETONIDE 40 MG/ML
40 INJECTION, SUSPENSION INTRA-ARTICULAR; INTRAMUSCULAR ONCE
Status: COMPLETED | OUTPATIENT
Start: 2022-06-20 | End: 2022-06-20

## 2022-06-20 RX ADMIN — TRIAMCINOLONE ACETONIDE 40 MG: 40 INJECTION, SUSPENSION INTRA-ARTICULAR; INTRAMUSCULAR at 16:26:00

## 2022-06-20 NOTE — PROCEDURES
Patient reports she is having more pain down towards the knee, might be radiating from the hip but does not feel that the hip is much a cause of pain for her today. She feels it might be somewhat difficult to get up onto the examination table for the hip, but doable. She would like to hold off on the hip injection only perform the injection today. If she does not get sufficient relief of hip pain, she may call us for hip injection appointment, or alternatively can call us for an IR referral for hip injection to be performed at the hospital.    After informed consent, the patient's left knee was marked, locally anesthetized with skin refrigerant, prepped with topical antiseptic, and injected with a mixture of 1mL 40mg/mL Kenalog, 2mL 1% lidocaine and 2mL 0.5% marcaine through the inferolateral portal.  A band-aid was applied. The patient tolerated the procedure well.     Kiki Izaguirre MD, [address] [facility]  Phone [phone]  Fax [phone]

## 2022-06-21 ENCOUNTER — TELEPHONE (OUTPATIENT)
Dept: ORTHOPEDICS CLINIC | Facility: CLINIC | Age: 82
End: 2022-06-21

## 2022-06-21 NOTE — TELEPHONE ENCOUNTER
Patients daughter-in-law came in and is requesting a parking placard be filled out for patient. $25.00 forms completion fee paid and was told form would be completed in 10 business days.

## 2022-07-21 ENCOUNTER — ORDER TRANSCRIPTION (OUTPATIENT)
Dept: CARDIAC REHAB | Facility: HOSPITAL | Age: 82
End: 2022-07-21

## 2022-07-21 DIAGNOSIS — Z95.2 S/P AVR: Primary | ICD-10-CM

## 2022-07-22 ENCOUNTER — TELEPHONE (OUTPATIENT)
Dept: FAMILY MEDICINE CLINIC | Facility: CLINIC | Age: 82
End: 2022-07-22

## 2022-07-22 NOTE — TELEPHONE ENCOUNTER
Philippe Villanueva from [facility] called, [facility] starting cardiac rehab.

## 2022-07-24 DIAGNOSIS — M17.12 PRIMARY OSTEOARTHRITIS OF LEFT KNEE: ICD-10-CM

## 2022-07-24 DIAGNOSIS — M16.12 PRIMARY OSTEOARTHRITIS OF LEFT HIP: ICD-10-CM

## 2022-07-25 RX ORDER — MELOXICAM 15 MG/1
TABLET ORAL
Qty: 90 TABLET | Refills: 0 | Status: SHIPPED | OUTPATIENT
Start: 2022-07-25 | End: 2022-10-04

## 2022-07-28 ENCOUNTER — CARDPULM VISIT (OUTPATIENT)
Dept: CARDIAC REHAB | Facility: HOSPITAL | Age: 82
End: 2022-07-28
Attending: INTERNAL MEDICINE
Payer: MEDICARE

## 2022-08-02 ENCOUNTER — HOSPITAL ENCOUNTER (OUTPATIENT)
Dept: CARDIOLOGY CLINIC | Facility: HOSPITAL | Age: 82
Discharge: HOME | End: 2022-08-02
Attending: INTERNAL MEDICINE
Payer: MEDICARE

## 2022-08-02 VITALS
DIASTOLIC BLOOD PRESSURE: 44 MMHG | RESPIRATION RATE: 16 BRPM | HEART RATE: 55 BPM | SYSTOLIC BLOOD PRESSURE: 158 MMHG | OXYGEN SATURATION: 97 %

## 2022-08-02 DIAGNOSIS — I25.10 CORONARY ARTERY DISEASE INVOLVING NATIVE CORONARY ARTERY OF NATIVE HEART WITHOUT ANGINA PECTORIS: ICD-10-CM

## 2022-08-02 DIAGNOSIS — I10 ESSENTIAL HYPERTENSION: Chronic | ICD-10-CM

## 2022-08-02 DIAGNOSIS — Z95.2 S/P TAVR (TRANSCATHETER AORTIC VALVE REPLACEMENT): ICD-10-CM

## 2022-08-02 PROCEDURE — 99213 OFFICE O/P EST LOW 20 MIN: CPT | Performed by: NURSE PRACTITIONER

## 2022-08-02 NOTE — ADDENDUM NOTE
Encounter addended by:  VIV Bajwa on: 8/2/2022 3:06 PM   Actions taken: Visit diagnoses modified, Charge Capture section accepted

## 2022-08-11 ENCOUNTER — TELEPHONE (OUTPATIENT)
Dept: CARDIOLOGY CLINIC | Facility: HOSPITAL | Age: 82
End: 2022-08-11

## 2022-08-11 NOTE — TELEPHONE ENCOUNTER
I called patient with the results of echo from 8/2 The patient's EF remains at 65%, aortic valve is functioning normally. Mean gradient is 7 mmHg. Overall, there has been no significant interval changes compared to previous echo on 3/8. Patient has an appointment in the [facility] in 5 months with echo following. They verbalized understanding of information.     Ayush Gamino RN  8/11/2022  1:12 PM

## 2022-08-24 ENCOUNTER — IMMUNIZATION (OUTPATIENT)
Dept: LAB | Age: 82
End: 2022-08-24
Attending: EMERGENCY MEDICINE
Payer: MEDICARE

## 2022-08-24 ENCOUNTER — MED REC SCAN ONLY (OUTPATIENT)
Dept: FAMILY MEDICINE CLINIC | Facility: CLINIC | Age: 82
End: 2022-08-24

## 2022-08-24 DIAGNOSIS — Z23 NEED FOR VACCINATION: Primary | ICD-10-CM

## 2022-08-24 PROCEDURE — 0094A SARSCOV2 VAC 50MCG/0.5ML IM: CPT

## 2022-10-03 DIAGNOSIS — M17.12 PRIMARY OSTEOARTHRITIS OF LEFT KNEE: ICD-10-CM

## 2022-10-03 DIAGNOSIS — M16.12 PRIMARY OSTEOARTHRITIS OF LEFT HIP: ICD-10-CM

## 2022-10-04 DIAGNOSIS — M17.12 PRIMARY OSTEOARTHRITIS OF LEFT KNEE: ICD-10-CM

## 2022-10-04 DIAGNOSIS — M16.12 PRIMARY OSTEOARTHRITIS OF LEFT HIP: ICD-10-CM

## 2022-10-04 RX ORDER — MELOXICAM 15 MG/1
TABLET ORAL
Qty: 90 TABLET | Refills: 0 | OUTPATIENT
Start: 2022-10-04

## 2022-10-04 RX ORDER — MELOXICAM 15 MG/1
15 TABLET ORAL DAILY
Qty: 90 TABLET | Refills: 0 | Status: SHIPPED | OUTPATIENT
Start: 2022-10-04

## 2022-10-31 ENCOUNTER — OFFICE VISIT (OUTPATIENT)
Dept: FAMILY MEDICINE CLINIC | Facility: CLINIC | Age: 82
End: 2022-10-31
Payer: MEDICARE

## 2022-10-31 VITALS
OXYGEN SATURATION: 98 % | BODY MASS INDEX: 40.64 KG/M2 | RESPIRATION RATE: 21 BRPM | DIASTOLIC BLOOD PRESSURE: 60 MMHG | HEART RATE: 53 BPM | HEIGHT: 60 IN | WEIGHT: 207 LBS | SYSTOLIC BLOOD PRESSURE: 122 MMHG

## 2022-10-31 DIAGNOSIS — Z23 NEED FOR VACCINATION: ICD-10-CM

## 2022-10-31 DIAGNOSIS — E66.01 MORBID (SEVERE) OBESITY DUE TO EXCESS CALORIES (HCC): ICD-10-CM

## 2022-10-31 DIAGNOSIS — Z00.00 ENCOUNTER FOR ANNUAL HEALTH EXAMINATION: Primary | ICD-10-CM

## 2022-10-31 DIAGNOSIS — R26.81 UNSTABLE GAIT: ICD-10-CM

## 2022-10-31 DIAGNOSIS — M17.0 OSTEOARTHRITIS OF BOTH KNEES, UNSPECIFIED OSTEOARTHRITIS TYPE: ICD-10-CM

## 2022-10-31 DIAGNOSIS — Z00.00 LABORATORY EXAMINATION ORDERED AS PART OF A ROUTINE GENERAL MEDICAL EXAMINATION: ICD-10-CM

## 2022-10-31 DIAGNOSIS — Z23 NEED FOR PNEUMOCOCCAL VACCINE: ICD-10-CM

## 2022-10-31 DIAGNOSIS — I10 ESSENTIAL HYPERTENSION: Chronic | ICD-10-CM

## 2022-10-31 DIAGNOSIS — Z95.2 S/P TAVR (TRANSCATHETER AORTIC VALVE REPLACEMENT): ICD-10-CM

## 2022-11-02 ENCOUNTER — LAB ENCOUNTER (OUTPATIENT)
Dept: LAB | Age: 82
End: 2022-11-02
Attending: EMERGENCY MEDICINE
Payer: MEDICARE

## 2022-11-02 DIAGNOSIS — Z00.00 LABORATORY EXAMINATION ORDERED AS PART OF A ROUTINE GENERAL MEDICAL EXAMINATION: ICD-10-CM

## 2022-11-02 LAB
ALBUMIN SERPL-MCNC: 3.5 G/DL (ref 3.4–5)
ALBUMIN/GLOB SERPL: 1 {RATIO} (ref 1–2)
ALP LIVER SERPL-CCNC: 89 U/L
ALT SERPL-CCNC: 18 U/L
ANION GAP SERPL CALC-SCNC: 5 MMOL/L (ref 0–18)
AST SERPL-CCNC: 25 U/L (ref 15–37)
BASOPHILS # BLD AUTO: 0.07 X10(3) UL (ref 0–0.2)
BASOPHILS NFR BLD AUTO: 1.2 %
BILIRUB SERPL-MCNC: 0.6 MG/DL (ref 0.1–2)
BUN BLD-MCNC: 41 MG/DL (ref 7–18)
CALCIUM BLD-MCNC: 8.9 MG/DL (ref 8.5–10.1)
CHLORIDE SERPL-SCNC: 108 MMOL/L (ref 98–112)
CHOLEST SERPL-MCNC: 165 MG/DL (ref ?–200)
CO2 SERPL-SCNC: 26 MMOL/L (ref 21–32)
CREAT BLD-MCNC: 1.46 MG/DL
EOSINOPHIL # BLD AUTO: 0.38 X10(3) UL (ref 0–0.7)
EOSINOPHIL NFR BLD AUTO: 6.3 %
ERYTHROCYTE [DISTWIDTH] IN BLOOD BY AUTOMATED COUNT: 13 %
FASTING PATIENT LIPID ANSWER: YES
FASTING STATUS PATIENT QL REPORTED: YES
GFR SERPLBLD BASED ON 1.73 SQ M-ARVRAT: 36 ML/MIN/1.73M2 (ref 60–?)
GLOBULIN PLAS-MCNC: 3.6 G/DL (ref 2.8–4.4)
GLUCOSE BLD-MCNC: 110 MG/DL (ref 70–99)
HCT VFR BLD AUTO: 42.9 %
HDLC SERPL-MCNC: 59 MG/DL (ref 40–59)
HGB BLD-MCNC: 13.6 G/DL
IMM GRANULOCYTES # BLD AUTO: 0.01 X10(3) UL (ref 0–1)
IMM GRANULOCYTES NFR BLD: 0.2 %
LDLC SERPL CALC-MCNC: 90 MG/DL (ref ?–100)
LYMPHOCYTES # BLD AUTO: 1.25 X10(3) UL (ref 1–4)
LYMPHOCYTES NFR BLD AUTO: 20.9 %
MCH RBC QN AUTO: 31.3 PG (ref 26–34)
MCHC RBC AUTO-ENTMCNC: 31.7 G/DL (ref 31–37)
MCV RBC AUTO: 98.6 FL
MONOCYTES # BLD AUTO: 0.73 X10(3) UL (ref 0.1–1)
MONOCYTES NFR BLD AUTO: 12.2 %
NEUTROPHILS # BLD AUTO: 3.55 X10 (3) UL (ref 1.5–7.7)
NEUTROPHILS # BLD AUTO: 3.55 X10(3) UL (ref 1.5–7.7)
NEUTROPHILS NFR BLD AUTO: 59.2 %
NONHDLC SERPL-MCNC: 106 MG/DL (ref ?–130)
OSMOLALITY SERPL CALC.SUM OF ELEC: 299 MOSM/KG (ref 275–295)
PLATELET # BLD AUTO: 178 10(3)UL (ref 150–450)
POTASSIUM SERPL-SCNC: 4.5 MMOL/L (ref 3.5–5.1)
PROT SERPL-MCNC: 7.1 G/DL (ref 6.4–8.2)
RBC # BLD AUTO: 4.35 X10(6)UL
SODIUM SERPL-SCNC: 139 MMOL/L (ref 136–145)
TRIGL SERPL-MCNC: 87 MG/DL (ref 30–149)
TSI SER-ACNC: 2.21 MIU/ML (ref 0.36–3.74)
VLDLC SERPL CALC-MCNC: 14 MG/DL (ref 0–30)
WBC # BLD AUTO: 6 X10(3) UL (ref 4–11)

## 2022-11-02 PROCEDURE — 84443 ASSAY THYROID STIM HORMONE: CPT

## 2022-11-02 PROCEDURE — 80061 LIPID PANEL: CPT

## 2022-11-02 PROCEDURE — 85025 COMPLETE CBC W/AUTO DIFF WBC: CPT

## 2022-11-02 PROCEDURE — 36415 COLL VENOUS BLD VENIPUNCTURE: CPT

## 2022-11-02 PROCEDURE — 80053 COMPREHEN METABOLIC PANEL: CPT

## 2023-01-02 DIAGNOSIS — M17.12 PRIMARY OSTEOARTHRITIS OF LEFT KNEE: ICD-10-CM

## 2023-01-02 DIAGNOSIS — M16.12 PRIMARY OSTEOARTHRITIS OF LEFT HIP: ICD-10-CM

## 2023-01-03 RX ORDER — MELOXICAM 15 MG/1
TABLET ORAL
Qty: 90 TABLET | Refills: 0 | Status: SHIPPED | OUTPATIENT
Start: 2023-01-03

## 2023-03-21 ENCOUNTER — HOSPITAL ENCOUNTER (OUTPATIENT)
Dept: CARDIOLOGY CLINIC | Facility: HOSPITAL | Age: 83
Discharge: HOME | End: 2023-03-21
Attending: INTERNAL MEDICINE
Payer: MEDICARE

## 2023-03-21 VITALS — HEART RATE: 72 BPM | SYSTOLIC BLOOD PRESSURE: 152 MMHG | OXYGEN SATURATION: 98 % | DIASTOLIC BLOOD PRESSURE: 62 MMHG

## 2023-03-21 PROCEDURE — 99212 OFFICE O/P EST SF 10 MIN: CPT

## 2023-03-25 DIAGNOSIS — M17.12 PRIMARY OSTEOARTHRITIS OF LEFT KNEE: ICD-10-CM

## 2023-03-25 DIAGNOSIS — M16.12 PRIMARY OSTEOARTHRITIS OF LEFT HIP: ICD-10-CM

## 2023-03-26 DIAGNOSIS — M17.12 PRIMARY OSTEOARTHRITIS OF LEFT KNEE: ICD-10-CM

## 2023-03-26 DIAGNOSIS — M16.12 PRIMARY OSTEOARTHRITIS OF LEFT HIP: ICD-10-CM

## 2023-03-27 RX ORDER — MELOXICAM 15 MG/1
TABLET ORAL
Qty: 30 TABLET | Refills: 0 | OUTPATIENT
Start: 2023-03-27

## 2023-03-31 RX ORDER — MELOXICAM 15 MG/1
TABLET ORAL
Qty: 90 TABLET | Refills: 0 | Status: SHIPPED | OUTPATIENT
Start: 2023-03-31

## 2023-04-03 ENCOUNTER — TELEPHONE (OUTPATIENT)
Dept: CARDIOLOGY CLINIC | Facility: HOSPITAL | Age: 83
End: 2023-04-03

## 2023-04-03 NOTE — TELEPHONE ENCOUNTER
1 year post TAVR echo results from 3/21/23 reviewed. Overall, there has been no significant interval changes compared to previous echo on 8/2/22. The valve is functioning normally. EF 65%.   Verbalized understanding of information

## 2023-06-24 DIAGNOSIS — M16.12 PRIMARY OSTEOARTHRITIS OF LEFT HIP: ICD-10-CM

## 2023-06-24 DIAGNOSIS — M17.12 PRIMARY OSTEOARTHRITIS OF LEFT KNEE: ICD-10-CM

## 2023-06-26 RX ORDER — MELOXICAM 15 MG/1
15 TABLET ORAL DAILY
Qty: 90 TABLET | Refills: 0 | Status: SHIPPED | OUTPATIENT
Start: 2023-06-26

## 2023-09-20 ENCOUNTER — TELEPHONE (OUTPATIENT)
Dept: FAMILY MEDICINE CLINIC | Facility: CLINIC | Age: 83
End: 2023-09-20

## 2023-09-25 DIAGNOSIS — M17.12 PRIMARY OSTEOARTHRITIS OF LEFT KNEE: ICD-10-CM

## 2023-09-25 DIAGNOSIS — M16.12 PRIMARY OSTEOARTHRITIS OF LEFT HIP: ICD-10-CM

## 2023-09-25 NOTE — TELEPHONE ENCOUNTER
Medication(s) to Refill:   Requested Prescriptions     Pending Prescriptions Disp Refills    MELOXICAM 15 MG Oral Tab [Pharmacy Med Name: MELOXICAM 15MG TABLETS] 90 tablet 0     Sig: TAKE 1 TABLET(15 MG) BY MOUTH DAILY         Reason for Medication Refill being sent to Provider / Reason Protocol Failed:  [] 90 day refill has already been granted  [] Blood Pressure out of range  [] Labs Abnormal/over due  [] Medication not previously prescribed by Provider  [x] Non-Protocol Medication  [] Controlled Substance   [] Due for appointment- no future appointment scheduled  [] No Follow up specified  Last Time Medication was Filled:  6/26/23    Last Office Visit with PCP: 10/31/22    When Patient was Due Back to the Office: yearly    Future Appointments:  No future appointments.       Last Blood Pressures:  BP Readings from Last 2 Encounters:  03/21/23 : 152/62  10/31/22 : 122/60    Action taken:  [] Refill approved per protocol  [x] Routing to provider for approval

## 2023-10-03 RX ORDER — MELOXICAM 15 MG/1
15 TABLET ORAL DAILY
Qty: 90 TABLET | Refills: 0 | Status: SHIPPED | OUTPATIENT
Start: 2023-10-03

## 2023-10-20 ENCOUNTER — OFFICE VISIT (OUTPATIENT)
Dept: FAMILY MEDICINE CLINIC | Facility: CLINIC | Age: 83
End: 2023-10-20
Payer: MEDICARE

## 2023-10-20 VITALS
HEIGHT: 60 IN | WEIGHT: 219 LBS | BODY MASS INDEX: 43 KG/M2 | SYSTOLIC BLOOD PRESSURE: 136 MMHG | RESPIRATION RATE: 21 BRPM | HEART RATE: 50 BPM | DIASTOLIC BLOOD PRESSURE: 36 MMHG | OXYGEN SATURATION: 98 %

## 2023-10-20 DIAGNOSIS — I10 ESSENTIAL HYPERTENSION: Chronic | ICD-10-CM

## 2023-10-20 DIAGNOSIS — I35.0 NONRHEUMATIC AORTIC VALVE STENOSIS: ICD-10-CM

## 2023-10-20 DIAGNOSIS — Z23 NEED FOR VACCINATION: ICD-10-CM

## 2023-10-20 DIAGNOSIS — E66.01 MORBID (SEVERE) OBESITY DUE TO EXCESS CALORIES (HCC): ICD-10-CM

## 2023-10-20 DIAGNOSIS — K59.00 CONSTIPATION, UNSPECIFIED CONSTIPATION TYPE: ICD-10-CM

## 2023-10-20 DIAGNOSIS — M31.4: ICD-10-CM

## 2023-10-20 DIAGNOSIS — I25.10 CORONARY ARTERY DISEASE INVOLVING NATIVE CORONARY ARTERY OF NATIVE HEART WITHOUT ANGINA PECTORIS: ICD-10-CM

## 2023-10-20 DIAGNOSIS — Z00.00 ENCOUNTER FOR ANNUAL HEALTH EXAMINATION: Primary | ICD-10-CM

## 2023-10-20 DIAGNOSIS — Z91.81 AT HIGH RISK FOR INJURY RELATED TO FALL: ICD-10-CM

## 2023-10-20 DIAGNOSIS — Z00.00 LABORATORY EXAMINATION ORDERED AS PART OF A ROUTINE GENERAL MEDICAL EXAMINATION: ICD-10-CM
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
## 2023-10-20 PROBLEM — D72.829 LEUKOCYTOSIS, UNSPECIFIED TYPE: Status: RESOLVED | Noted: 2021-10-06 | Resolved: 2023-10-20

## 2023-10-20 PROBLEM — L03.119 CELLULITIS OF HAND: Status: RESOLVED | Noted: 2021-10-06 | Resolved: 2023-10-20

## 2023-10-20 PROCEDURE — G0439 PPPS, SUBSEQ VISIT: HCPCS | Performed by: EMERGENCY MEDICINE

## 2023-10-20 PROCEDURE — 1125F AMNT PAIN NOTED PAIN PRSNT: CPT | Performed by: EMERGENCY MEDICINE

## 2023-10-20 PROCEDURE — 90662 IIV NO PRSV INCREASED AG IM: CPT | Performed by: EMERGENCY MEDICINE

## 2023-10-20 PROCEDURE — G0008 ADMIN INFLUENZA VIRUS VAC: HCPCS | Performed by: EMERGENCY MEDICINE

## 2023-10-20 NOTE — PATIENT INSTRUCTIONS
Thank you for choosing [facility]  To Do:  FOR CHASIDY Cronin-er 59    Have blood tests done  Use over-the-counter MiraLAX daily, 1 capful with juice or water. Use daily until with regular soft bowel movements and to fully evacuated. Then he may use MiraLAX as needed. Increase dietary fiber with lots of fruits and vegetables and increasing oral fluids may also use over-the-counter fiber supplements such as MiraLAX and Benefiber etc.  Follow up once a year for   Will arrange for home health services              [address]   Tests on this list are recommended by your physician but may not be covered, or covered at this frequency, by your insurer. Please check with your insurance carrier before scheduling to verify coverage.    PREVENTATIVE SERVICES FREQUENCY &  COVERAGE DETAILS LAST COMPLETION DATE   Diabetes Screening    Fasting Blood Sugar /  Glucose    One screening every 12 months if never tested or if previously tested but not diagnosed with pre-diabetes   One screening every 6 months if diagnosed with pre-diabetes Lab Results   Component Value Date     (H) 11/02/2022        Cardiovascular Disease Screening    Lipid Panel  Cholesterol  Lipoprotein (HDL)  Triglycerides Covered every 5 years for all Medicare beneficiaries without apparent signs or symptoms of cardiovascular disease Lab Results   Component Value Date    CHOLEST 165 11/02/2022    HDL 59 11/02/2022    LDL 90 11/02/2022    TRIG 87 11/02/2022         Electrocardiogram (EKG)   Covered if needed at Welcome to Medicare, and non-screening if indicated for medical reasons 02/21/2022      Ultrasound Screening for Abdominal Aortic Aneurysm (AAA) Covered once in a lifetime for one of the following risk factors    Men who are 73-68 years old and have ever smoked    Anyone with a family history -     Colorectal Cancer Screening  Covered for ages 52-80; only need ONE of the following:    Colonoscopy Covered every 10 years    Covered every 2 years if patient is at high risk or previous colonoscopy was abnormal -    No recommendations at this time    Flexible Sigmoidoscopy   Covered every 4 years -    Fecal Occult Blood Test Covered annually -   Bone Density Screening    Bone density screening    Covered every 2 years after age 72 if diagnosed with risk of osteoporosis or estrogen deficiency. Covered yearly for long-term glucocorticoid medication use (Steroids) Last Dexa Scan:    XR DEXA BONE DENSITOMETRY (CPT=77080) 10/20/2021      No recommendations at this time   Pap and Pelvic    Pap   Covered every 2 years for women at normal risk;  Annually if at high risk -  No recommendations at this time    Chlamydia Annually if high risk -  No recommendations at this time   Screening Mammogram    Mammogram     Recommend annually for all female patients aged 36 and older    One baseline mammogram covered for patients aged 32-38 -    No recommendations at this time    Immunizations    Influenza Covered once per flu season  Please get every year 10/31/2022  Influenza Vaccine(1) due on 10/01/2023    Pneumococcal Each vaccine ([id number] & Zwniuxmtn31) covered once after 72 Prevnar 13: 10/29/2021    Smxnowokc10: 10/31/2022     No recommendations at this time    Hepatitis B One screening covered for patients with certain risk factors   -  No recommendations at this time    Tetanus Toxoid Not covered by Medicare Part B unless medically necessary (cut with metal); may be covered with your pharmacy prescription benefits 10/06/2021    Tetanus, Diptheria and Pertusis TD and TDaP Not covered by Medicare Part B -  No recommendations at this time    Zoster Not covered by Medicare Part B; may be covered with your pharmacy  prescription benefits -  Zoster Vaccines(1 of 2) Never done

## 2023-10-21 ENCOUNTER — LAB ENCOUNTER (OUTPATIENT)
Dept: LAB | Age: 83
End: 2023-10-21
Attending: EMERGENCY MEDICINE
Payer: MEDICARE

## 2023-10-21 DIAGNOSIS — Z00.00 LABORATORY EXAMINATION ORDERED AS PART OF A ROUTINE GENERAL MEDICAL EXAMINATION: ICD-10-CM

## 2023-10-21 LAB
ALBUMIN SERPL-MCNC: 3.3 G/DL (ref 3.4–5)
ALBUMIN/GLOB SERPL: 0.9 {RATIO} (ref 1–2)
ALP LIVER SERPL-CCNC: 103 U/L
ALT SERPL-CCNC: 42 U/L
ANION GAP SERPL CALC-SCNC: 7 MMOL/L (ref 0–18)
AST SERPL-CCNC: 42 U/L (ref 15–37)
BASOPHILS # BLD AUTO: 0.05 X10(3) UL (ref 0–0.2)
BASOPHILS NFR BLD AUTO: 0.7 %
BILIRUB SERPL-MCNC: 0.5 MG/DL (ref 0.1–2)
BUN BLD-MCNC: 42 MG/DL (ref 7–18)
CALCIUM BLD-MCNC: 9.3 MG/DL (ref 8.5–10.1)
CHLORIDE SERPL-SCNC: 114 MMOL/L (ref 98–112)
CHOLEST SERPL-MCNC: 145 MG/DL (ref ?–200)
CO2 SERPL-SCNC: 21 MMOL/L (ref 21–32)
CREAT BLD-MCNC: 1.61 MG/DL
EGFRCR SERPLBLD CKD-EPI 2021: 32 ML/MIN/1.73M2 (ref 60–?)
EOSINOPHIL # BLD AUTO: 0.28 X10(3) UL (ref 0–0.7)
EOSINOPHIL NFR BLD AUTO: 4 %
ERYTHROCYTE [DISTWIDTH] IN BLOOD BY AUTOMATED COUNT: 13.2 %
FASTING PATIENT LIPID ANSWER: YES
FASTING STATUS PATIENT QL REPORTED: YES
GLOBULIN PLAS-MCNC: 3.5 G/DL (ref 2.8–4.4)
GLUCOSE BLD-MCNC: 110 MG/DL (ref 70–99)
HCT VFR BLD AUTO: 41.2 %
HDLC SERPL-MCNC: 55 MG/DL (ref 40–59)
HGB BLD-MCNC: 13.2 G/DL
IMM GRANULOCYTES # BLD AUTO: 0.06 X10(3) UL (ref 0–1)
IMM GRANULOCYTES NFR BLD: 0.9 %
LDLC SERPL CALC-MCNC: 73 MG/DL (ref ?–100)
LYMPHOCYTES # BLD AUTO: 1.15 X10(3) UL (ref 1–4)
LYMPHOCYTES NFR BLD AUTO: 16.5 %
MCH RBC QN AUTO: 30.6 PG (ref 26–34)
MCHC RBC AUTO-ENTMCNC: 32 G/DL (ref 31–37)
MCV RBC AUTO: 95.4 FL
MONOCYTES # BLD AUTO: 0.57 X10(3) UL (ref 0.1–1)
MONOCYTES NFR BLD AUTO: 8.2 %
NEUTROPHILS # BLD AUTO: 4.88 X10 (3) UL (ref 1.5–7.7)
NEUTROPHILS # BLD AUTO: 4.88 X10(3) UL (ref 1.5–7.7)
NEUTROPHILS NFR BLD AUTO: 69.7 %
NONHDLC SERPL-MCNC: 90 MG/DL (ref ?–130)
OSMOLALITY SERPL CALC.SUM OF ELEC: 305 MOSM/KG (ref 275–295)
PLATELET # BLD AUTO: 194 10(3)UL (ref 150–450)
POTASSIUM SERPL-SCNC: 4.2 MMOL/L (ref 3.5–5.1)
PROT SERPL-MCNC: 6.8 G/DL (ref 6.4–8.2)
RBC # BLD AUTO: 4.32 X10(6)UL
SODIUM SERPL-SCNC: 142 MMOL/L (ref 136–145)
TRIGL SERPL-MCNC: 90 MG/DL (ref 30–149)
TSI SER-ACNC: 2.3 MIU/ML (ref 0.36–3.74)
VLDLC SERPL CALC-MCNC: 14 MG/DL (ref 0–30)
WBC # BLD AUTO: 7 X10(3) UL (ref 4–11)

## 2023-10-21 PROCEDURE — 36415 COLL VENOUS BLD VENIPUNCTURE: CPT

## 2023-10-21 PROCEDURE — 80053 COMPREHEN METABOLIC PANEL: CPT

## 2023-10-21 PROCEDURE — 80061 LIPID PANEL: CPT

## 2023-10-21 PROCEDURE — 85025 COMPLETE CBC W/AUTO DIFF WBC: CPT

## 2023-10-21 PROCEDURE — 84443 ASSAY THYROID STIM HORMONE: CPT

## 2023-10-27 DIAGNOSIS — R79.9 ELEVATED BUN: Primary | ICD-10-CM

## 2023-12-24 DIAGNOSIS — M16.12 PRIMARY OSTEOARTHRITIS OF LEFT HIP: ICD-10-CM

## 2023-12-24 DIAGNOSIS — M17.12 PRIMARY OSTEOARTHRITIS OF LEFT KNEE: ICD-10-CM

## 2023-12-26 NOTE — TELEPHONE ENCOUNTER
Medication(s) to Refill:   Requested Prescriptions     Pending Prescriptions Disp Refills    MELOXICAM 15 MG Oral Tab [Pharmacy Med Name: MELOXICAM 15MG TABLETS] 90 tablet 0     Sig: TAKE 1 TABLET(15 MG) BY MOUTH DAILY       Reason for Medication Refill being sent to Provider / Reason Protocol Failed:  [] 90 day refill has already been granted  [] Blood Pressure out of range  [] Labs Abnormal/over due  [] Medication not previously prescribed by Provider  [x] Non-Protocol Medication  [] Controlled Substance   [] Due for appointment- no future appointment scheduled  [] No Follow up specified    Last Time Medication was Filled:  10/3/23    Last Office Visit with PCP: 10/23/23    When Patient was Due Back to the Office: Not on file. Future Appointments:  No future appointments.     Last Blood Pressures:  BP Readings from Last 2 Encounters:   10/20/23 136/36   03/21/23 152/62     Action taken:  [] Refill approved per protocol  [x] Routing to provider for approval

## 2023-12-31 RX ORDER — MELOXICAM 15 MG/1
15 TABLET ORAL DAILY
Qty: 90 TABLET | Refills: 0 | Status: SHIPPED | OUTPATIENT
Start: 2023-12-31

## 2024-03-23 DIAGNOSIS — M16.12 PRIMARY OSTEOARTHRITIS OF LEFT HIP: ICD-10-CM

## 2024-03-23 DIAGNOSIS — M17.12 PRIMARY OSTEOARTHRITIS OF LEFT KNEE: ICD-10-CM

## 2024-03-25 RX ORDER — MELOXICAM 15 MG/1
15 TABLET ORAL DAILY
Qty: 90 TABLET | Refills: 0 | Status: SHIPPED | OUTPATIENT
Start: 2024-03-25

## 2024-06-21 DIAGNOSIS — M16.12 PRIMARY OSTEOARTHRITIS OF LEFT HIP: ICD-10-CM

## 2024-06-21 DIAGNOSIS — M17.12 PRIMARY OSTEOARTHRITIS OF LEFT KNEE: ICD-10-CM

## 2024-06-21 RX ORDER — MELOXICAM 15 MG/1
15 TABLET ORAL DAILY
Qty: 30 TABLET | Refills: 0 | Status: SHIPPED | OUTPATIENT
Start: 2024-06-21

## 2024-07-08 ENCOUNTER — TELEPHONE (OUTPATIENT)
Dept: FAMILY MEDICINE CLINIC | Facility: CLINIC | Age: 84
End: 2024-07-08

## 2024-07-08 NOTE — TELEPHONE ENCOUNTER
Patient has an appointment with you at the end of the month and her caregiver is asking if there are any labs you want patient to have done prior to appointment?  Please advise.

## 2024-07-08 NOTE — TELEPHONE ENCOUNTER
Patient has an upcoming appointment on 7/30/24, asking if any labs need to be done.  Last set of lab 10/21/23  LOV 10/20/24  Please advise

## 2024-07-08 NOTE — TELEPHONE ENCOUNTER
Pt requesting lab orders prior to 7/30 appt. LOV 10/20 & pt had annual labs done 10/21/23. Please advise on lab orders for pt. She was due for BMP in Nov 23

## 2024-07-23 DIAGNOSIS — M16.12 PRIMARY OSTEOARTHRITIS OF LEFT HIP: ICD-10-CM

## 2024-07-23 DIAGNOSIS — M17.12 PRIMARY OSTEOARTHRITIS OF LEFT KNEE: ICD-10-CM

## 2024-07-23 RX ORDER — MELOXICAM 15 MG/1
15 TABLET ORAL DAILY
Qty: 30 TABLET | Refills: 0 | Status: SHIPPED | OUTPATIENT
Start: 2024-07-23

## 2024-07-30 ENCOUNTER — LAB ENCOUNTER (OUTPATIENT)
Dept: LAB | Age: 84
End: 2024-07-30
Attending: EMERGENCY MEDICINE
Payer: MEDICARE

## 2024-07-30 ENCOUNTER — OFFICE VISIT (OUTPATIENT)
Dept: FAMILY MEDICINE CLINIC | Facility: CLINIC | Age: 84
End: 2024-07-30
Payer: MEDICARE

## 2024-07-30 VITALS — DIASTOLIC BLOOD PRESSURE: 60 MMHG | HEART RATE: 43 BPM | SYSTOLIC BLOOD PRESSURE: 136 MMHG | OXYGEN SATURATION: 94 %

## 2024-07-30 DIAGNOSIS — I10 ESSENTIAL HYPERTENSION: Chronic | ICD-10-CM

## 2024-07-30 DIAGNOSIS — I89.0 LYMPHEDEMA: ICD-10-CM

## 2024-07-30 DIAGNOSIS — R79.9 ELEVATED BUN: ICD-10-CM

## 2024-07-30 DIAGNOSIS — Z95.2 HISTORY OF TRANSCATHETER AORTIC VALVE REPLACEMENT (TAVR): ICD-10-CM

## 2024-07-30 DIAGNOSIS — E66.01 MORBID (SEVERE) OBESITY DUE TO EXCESS CALORIES (HCC): ICD-10-CM

## 2024-07-30 DIAGNOSIS — R26.81 UNSTABLE GAIT: ICD-10-CM

## 2024-07-30 DIAGNOSIS — M31.4: ICD-10-CM

## 2024-07-30 DIAGNOSIS — Z95.2 S/P TAVR (TRANSCATHETER AORTIC VALVE REPLACEMENT): ICD-10-CM

## 2024-07-30 DIAGNOSIS — Z00.00 ENCOUNTER FOR ANNUAL HEALTH EXAMINATION: Primary | ICD-10-CM

## 2024-07-30 PROCEDURE — 36415 COLL VENOUS BLD VENIPUNCTURE: CPT

## 2024-07-30 PROCEDURE — 80048 BASIC METABOLIC PNL TOTAL CA: CPT

## 2024-07-30 PROCEDURE — 99214 OFFICE O/P EST MOD 30 MIN: CPT | Performed by: EMERGENCY MEDICINE

## 2024-07-30 NOTE — PROGRESS NOTES
Chief Complaint:   Chief Complaint   Patient presents with    Follow - Up     Patient moving to nursing facility.     HPI:   This is a 84 year old female     Patient has plans to move out of state and move into a skilled nursing facility.  Anticipating moving in the next few weeks.    HYPERTENSION  On atenolol once a day.  Denies any chest pain or shortness of breath.   Denies any syncopal episodes denies any shortness of breath or lightheadedness.  S/P TAVR and stent in Feb 2022, Dr Tate    Follows up with cardiology once a year. On Clopidogrel due for cardiology recheck      C/O bilat leg pain, On and off. Better with elevation and sleep.  Patient with known history of lymphedema.  Leg pain has been ongoing for many years.  On iuboprofen for various aches and pains in hip and knee.   C/O left leg pain, chronic. Waxing and waning. Worse with prolonged activity  X-rays done in July 2021 shows moderate to severe osteoarthritis of the left hip and left knee.  Denies any fall. Currently living with son and daughter-in-law.      Uses walker to ambulate. Patient has a hard time standing up by herself with walker for prolonged time.      Wt Readings from Last 6 Encounters:   10/20/23 219 lb (99.3 kg)   10/31/22 207 lb (93.9 kg)   02/14/22 198 lb (89.8 kg)   02/04/22 189 lb 11.2 oz (86 kg)   12/10/21 193 lb (87.5 kg)   10/29/21 192 lb (87.1 kg)         [facility]       Past Medical History:    Aortic stenosis    Essential hypertension    High blood pressure    Leukocytosis    Osteoarthritis     Past Surgical History:   Procedure Laterality Date    Cataract       Social History:  Social History     Socioeconomic History    Marital status:    Tobacco Use    Smoking status: Never    Smokeless tobacco: Never   Substance and Sexual Activity    Alcohol use: Yes     Social Determinants of Health     Financial Resource Strain: Low Risk  (2/7/2022)    [facility] from [facility]    Overall  Financial Resource Strain (CARDIA)     Difficulty of Paying Living Expenses: Not very hard   Transportation Needs: Unmet Transportation Needs (2/7/2022)    [facility] from [facility]    PRAPARE - Transportation     Lack of Transportation (Medical): Yes     Lack of Transportation (Non-Medical): No     Family History:  No family history on file.  Allergies:  No Known Allergies  Current Meds:  Current Outpatient Medications on File Prior to Visit   Medication Sig Dispense Refill    MELOXICAM 15 MG Oral Tab TAKE 1 TABLET(15 MG) BY MOUTH DAILY 30 tablet 0    atenolol 25 MG Oral Tab Take 1 tablet (25 mg total) by mouth daily.  0    ASPIRIN 81 OR Take by mouth every other day.      clopidogrel 75 MG Oral Tab Take 1 tablet (75 mg total) by mouth daily.       No current facility-administered medications on file prior to visit.      Counseling given: Not Answered         PROBLEM LIST     Patient Active Problem List   Diagnosis    Essential hypertension    Aortic valve stenosis    Primary osteoarthritis of left hip    Primary osteoarthritis of left knee    Coronary artery disease    S/P TAVR (transcatheter aortic valve replacement)    Morbid (severe) obesity due to excess calories (HCC)    Aortic arch syndrome (HCC)    Lymphedema    Unstable gait         REVIEW OF SYSTEMS:   Review of systems significant for leg swelling  The rest of the review of systems is negative except those stated as above    PHYSICAL EXAM:   /60   Pulse (!) 43   SpO2 94%  Estimated body mass index is 42.77 kg/m² as calculated from the following:    Height as of 10/20/23: 5' (1.524 m).    Weight as of 10/20/23: 219 lb (99.3 kg).   Vital signs reviewed.Appears stated age, well groomed.  GENERAL: well developed, well nourished, well hydrated, no distress wheelchairbound  SKIN: good skin turgor, no obvious rashes  HEENT: atraumatic, normocephalic, ears, nose and throat are clear  EYES: sclera non icteric  bilateral  NECK: supple, no adenopathy, no thyromegaly  LUNGS: clear to auscultation, no RRW  CARDIO: RRR without murmur  EXTREMITIES: no cyanosis, clubbing or edema, + bilateral lymphedema  Mild diffuse tenderness no calf tenderness Sariah sign neg bilat  GI:soft Nontender Normoactive BS.  No palpable masses no guarding rigidity no rebound tenderness    Recent Results (from the past 672 hour(s))   Basic Metabolic Panel (8)    Collection Time: 07/30/24 12:21 PM   Result Value Ref Range    Glucose 100 (H) 70 - 99 mg/dL    Sodium 141 136 - 145 mmol/L    Potassium 5.6 (H) 3.5 - 5.1 mmol/L    Chloride 111 98 - 112 mmol/L    CO2 25.0 21.0 - 32.0 mmol/L    Anion Gap 5 0 - 18 mmol/L    BUN 31 (H) 9 - 23 mg/dL    Creatinine 1.34 (H) 0.55 - 1.02 mg/dL    Calcium, Total 9.2 8.7 - 10.4 mg/dL    Calculated Osmolality 299 (H) 275 - 295 mOsm/kg    eGFR-Cr 39 (L) >=60 mL/min/1.73m2    Patient Fasting for BMP? Yes            ASSESSMENT AND PLAN:     Diagnoses and all orders for this visit:    Encounter for annual health examination    Lymphedema    History of transcatheter aortic valve replacement (TAVR)    Aortic arch syndrome (HCC)    Morbid (severe) obesity due to excess calories (HCC)    S/P TAVR (transcatheter aortic valve replacement)    Essential hypertension    Unstable gait        1. Encounter for annual health examination    2. Lymphedema  Referral to lymphedema clinic when he moves to skilled nursing facility.  Encourage patient to establish care with a primary care physician when she moves out of state.  Recommend over-the-counter compression socks.    3. History of transcatheter aortic valve replacement (TAVR)  Plan per cardiology    4. Aortic arch syndrome (HCC)  Plan per cardiology    5. Morbid (severe) obesity due to excess calories (HCC)  Counseled on weight loss    6. S/P TAVR (transcatheter aortic valve replacement)  Plan per cardiology currently on Plavix    7. Essential hypertension  Stable continue current  medications    8. Unstable gait  Recommend physical therapy for gait improvement and fall prevention.      PATIENT INSTRUCTIONS:    Follow up with cardiology if able to otherwise need to establish care with cardiologist in [place]  Recommend physical therapy for gait improvement and mobility  Recommend lymphedema clinic for leg swelling  Recommend over the counter complression socks, 15-20 mm Hg. Check [email or web address] or [employer]  Continue with atenolol  Repeat blood test to check kidneys  Ok to continue with Meloxicam as needed    FOLLOW UP: 6 months here or with new primary MD          Health Maintenance Due   Topic Date Due    Zoster Vaccines (1 of 2) Never done    COVID-19 Vaccine (5 - 2023-24 season) 09/01/2023    Annual Depression Screening  01/01/2024    Fall Risk Screening (Annual)  01/01/2024    Annual Physical  10/20/2024

## 2024-07-30 NOTE — PATIENT INSTRUCTIONS
Thank you for choosing [facility]  To Do:  FOR CHASIDY BENAVIDES    Follow up with cardiology if able to otherwise need to establish care with cardiologist in [place]  Recommend physical therapy for gait improvement and mobility  Recommend lymphedema clinic for leg swelling  Recommend over the counter complression socks, 15-20 mm Hg. Check [email or web address] or [employer]  Continue with atenolol  Repeat blood test to check kidneys  Ok to continue with Meloxicam

## 2024-07-31 LAB
ANION GAP SERPL CALC-SCNC: 5 MMOL/L (ref 0–18)
BUN BLD-MCNC: 31 MG/DL (ref 9–23)
CALCIUM BLD-MCNC: 9.2 MG/DL (ref 8.7–10.4)
CHLORIDE SERPL-SCNC: 111 MMOL/L (ref 98–112)
CO2 SERPL-SCNC: 25 MMOL/L (ref 21–32)
CREAT BLD-MCNC: 1.34 MG/DL
EGFRCR SERPLBLD CKD-EPI 2021: 39 ML/MIN/1.73M2 (ref 60–?)
FASTING STATUS PATIENT QL REPORTED: YES
GLUCOSE BLD-MCNC: 100 MG/DL (ref 70–99)
OSMOLALITY SERPL CALC.SUM OF ELEC: 299 MOSM/KG (ref 275–295)
POTASSIUM SERPL-SCNC: 5.6 MMOL/L (ref 3.5–5.1)
SODIUM SERPL-SCNC: 141 MMOL/L (ref 136–145)

## 2024-08-02 ENCOUNTER — TELEPHONE (OUTPATIENT)
Dept: FAMILY MEDICINE CLINIC | Facility: CLINIC | Age: 84
End: 2024-08-02

## 2024-08-02 NOTE — TELEPHONE ENCOUNTER
Davidvd a call from [facility] about this pt. They are waiting for Provider to fax referral, H&P, and med list. Please fax to [facility] at [phone]

## 2024-08-02 NOTE — TELEPHONE ENCOUNTER
[facility] is asking for a referral, med list and H&P be faxed to them    LOV 7/30/24    Ok for referral?

## 2024-08-06 PROBLEM — I35.0 AORTIC VALVE STENOSIS: Status: RESOLVED | Noted: 2021-10-31 | Resolved: 2024-08-06

## 2024-08-06 PROBLEM — I89.0 LYMPHEDEMA: Status: ACTIVE | Noted: 2024-08-06

## 2024-08-06 PROBLEM — R26.81 UNSTABLE GAIT: Status: ACTIVE | Noted: 2024-08-06

## 2024-08-06 NOTE — TELEPHONE ENCOUNTER
Office notes printed and faxed to [facility] at [phone]  Fax confirmation received  Called [facility] at [phone] to let them know we sent the fax with the office notes  Left VM

## 2024-08-09 ENCOUNTER — TELEPHONE (OUTPATIENT)
Dept: FAMILY MEDICINE CLINIC | Facility: CLINIC | Age: 84
End: 2024-08-09

## 2024-08-09 NOTE — TELEPHONE ENCOUNTER
Left msg for patient's son with result/instructions.  If patient can repeat lab before she moves or if not, make sure to have this done at her new place of residence.  To call office if any questions

## 2024-08-09 NOTE — TELEPHONE ENCOUNTER
----- Message from Kay Sheehan sent at 8/8/2024 10:18 PM CDT -----  BMP still with elevated BUN and persistent renal insuffieicncy  Pt moving out of town to [facility], Pt will need repeat BMP to recheck K. Pt will also need to follow up with nephrologist in new area of residence  Pls discuss with son and patient

## 2025-03-04 NOTE — TELEPHONE ENCOUNTER
Patient had 3 teeth extracted and has been put on amoxicilan  500mg,  1 tab 3x a day, for 7 days     Just an FYI     If any questions, please call .

## (undated) DIAGNOSIS — M17.12 PRIMARY OSTEOARTHRITIS OF LEFT KNEE: ICD-10-CM

## (undated) DIAGNOSIS — Z95.2 S/P TAVR (TRANSCATHETER AORTIC VALVE REPLACEMENT): ICD-10-CM

## (undated) DIAGNOSIS — M16.12 PRIMARY OSTEOARTHRITIS OF LEFT HIP: ICD-10-CM

## (undated) DIAGNOSIS — Z02.9 ENCOUNTERS FOR UNSPECIFIED ADMINISTRATIVE PURPOSE: ICD-10-CM

## (undated) DEVICE — SUTURE SILK 0 FSL

## (undated) DEVICE — ANGIO PACK-LF: Brand: MEDLINE INDUSTRIES, INC.

## (undated) DEVICE — KIT MICROPUNCTURE STIFF VSI

## (undated) DEVICE — PERCLOSE PROGLIDE™ SUTURE-MEDIATED CLOSURE SYSTEM: Brand: PERCLOSE PROGLIDE™

## (undated) DEVICE — PINNACLE INTRODUCER SHEATH: Brand: PINNACLE

## (undated) DEVICE — FIXED CORE WIRE GUIDE SAFE-T-J, CURVED: Brand: COOK

## (undated) DEVICE — TRANSPOSAL ULTRAFLEX DUO/QUAD ULTRA CART MANIFOLD

## (undated) DEVICE — 3M™ IOBAN™ 2 ANTIMICROBIAL INCISE DRAPE 6651EZ: Brand: IOBAN™ 2

## (undated) DEVICE — GUIDEWIRE SAFARI2 W2.9XH3.2CM

## (undated) DEVICE — AMPLATZ SUPER STIFF 035X180

## (undated) DEVICE — 1010 S-DRAPE TOWEL DRAPE 10/BX: Brand: STERI-DRAPE™

## (undated) DEVICE — CATH ANGIO 6F PIG

## (undated) DEVICE — 3M™ BAIR HUGGER® UNDERBODY BLANKET, FULL ACCESS, 10 PER CASE 63500: Brand: BAIR HUGGER™

## (undated) DEVICE — CATH ANGIO 6FR PIG ANGLED

## (undated) DEVICE — REM POLYHESIVE ADULT PATIENT RETURN ELECTRODE: Brand: VALLEYLAB

## (undated) DEVICE — STANDARD HYPODERMIC NEEDLE,POLYPROPYLENE HUB: Brand: MONOJECT

## (undated) DEVICE — GWIRE PTFE 035X260CM FX 3M J

## (undated) DEVICE — TOWEL SURG OR 17X30IN BLUE

## (undated) DEVICE — CATH ANGIO 6F AL1

## (undated) DEVICE — ADULT, RADIOTRANSPARENT ELEMENT, COMPATIBLE W/ GRAY FLAT CONNECTOR: Brand: DEFIBRILLATION ELECTRODES

## (undated) DEVICE — .035 STRAIGHT WIRE 150

## (undated) DEVICE — CATH ANGIO 6F FR4

## (undated) DEVICE — GAUZE SPONGES,12 PLY: Brand: CURITY

## (undated) DEVICE — STERILE POLYISOPRENE POWDER-FREE SURGICAL GLOVES: Brand: PROTEXIS

## (undated) DEVICE — DRAPE TABLE COVER 44X90 TC-10

## (undated) DEVICE — SPONGE LAP 18X18IN 7IN LOOP

## (undated) DEVICE — TIBURON DRAPE TOWELS: Brand: CONVERTORS

## (undated) DEVICE — GOWN,SIRUS,FABRIC-REINFORCED,X-LARGE: Brand: MEDLINE

## (undated) DEVICE — CSTM UNIVERSAL DRAPE PK: Brand: MEDLINE INDUSTRIES, INC.

## (undated) DEVICE — GLOVE SURG TRIUMPH SZ 8

## (undated) DEVICE — X-RAY DETECTABLE SPONGES,16 PLY: Brand: VISTEC

## (undated) DEVICE — 3M™ TEGADERM™ TRANSPARENT FILM DRESSING, 1626W, 4 IN X 4-3/4 IN (10 CM X 12 CM), 50 EACH/CARTON, 4 CARTON/CASE: Brand: 3M™ TEGADERM™

## (undated) DEVICE — SYRINGE 30ML LL TIP

## (undated) DEVICE — Device

## (undated) DEVICE — SOLUTION SURG DURA PREP HAZMAT

## (undated) DEVICE — CATH PACING 5F RT HEART FLOW

## (undated) NOTE — IP AVS SNAPSHOT
Patient Demographics     Address (Temporary)  [facility] [address] Contact Numbers (Temporary)  [phone]      Emergency Contact(s)     Name Relation Home Work Na Son [phone]        Allergies as of 10/8/ Tabs                    Where to Get Your Medications      These medications were sent to [place] [id number] [id number] [facility], [address] [place] [address] Drive AT [address] , [phone], [facility], [place] [address] Procedure Component Value Units Date/Time    Blood Culture [[id number]] Collected: 10/06/21 1247    Order Status: Completed Lab Status: Preliminary result Updated: 10/07/21 1600    Specimen: Blood,peripheral      Blood Culture Result No Growth 1 Day    Bloo Osteoarthritis         Past Surgical History: History reviewed. No pertinent surgical history. Social History:  reports that she has never smoked. She has never used smokeless tobacco. She reports current alcohol use.     Family History: No known history 10/06/21  1248   *   BUN 27*   CREATSERUM 1.22*   GFRAA 48*   GFRNAA 42*   CA 8.8   ALB 3.3*      K 4.1      CO2 24.0   ALKPHO 96   AST 14*   ALT 15   BILT 1.0   TP 7.0       Estimated Creatinine Clearance: 26 mL/min (A) (based on SCr of Signed    : Pawan Lockhart MD (Physician)     Consult Orders    1.  ED Consult to Orthopedic Surgery [[id number]] ordered by Dnaiel Chalres DO at 10/06/21 [address]: Bite and Cellulitis      DIAGNOSIS: Cellulitis of hand  (prima clumsiness, dropping things, no tremor, no headaches/ migraines   PSYCHE: no depression, no anxiety. HEMATOLOGY: denies hx anemia; denies bruising or excessive bleeding.    ENDOCRINE: denies excessive thirst or urination; denies unexpected wt gain or wt l for this encounter.         Physical Therapy Notes (last 72 hours)      Physical Therapy Note signed by Bala Oreilly PT at 10/8/2021  1:21 PM  Version 1 of 1    Author: Bala Oreilly PT Service: Rehab Author Type: Physical Therapist    Filed: 10/8/2021  1:21 home to assist as needed. They are willing to get her whatever equipment she may need. SUBJECTIVE  \"That wasn't too bad\"    Patient self-stated goal is to get therapy for her legs.      OBJECTIVE  Precautions: Bed/chair alarm  Fall Risk: High fall ris Assessment   Gait Assistance: Contact guard assist; Supervision  Distance (ft): 15, 10   Assistive Device: Rolling walker  Pattern: L Decreased stance time (step to pattern )  Stoop/Curb Assistance: Not tested  Comment : Pt ascended and descended 4 steps w clinical presentation is evolving and overall the evaluation complexity is considered low. These impairments and comorbidities manifest themselves as functional limitations in independent bed mobility, transfers, and gait.   The patient is below baseline a [facility] Physical Therapy [facility]      Multidisciplinary Problems     Active Goals        Problem: Patient/Family Goals    Goal Priority Disciplines Outcome Interventions   Patient/Family Long Term Goal     Interdisciplinary Adequate for Dischar